# Patient Record
Sex: MALE | Race: WHITE | NOT HISPANIC OR LATINO | URBAN - METROPOLITAN AREA
[De-identification: names, ages, dates, MRNs, and addresses within clinical notes are randomized per-mention and may not be internally consistent; named-entity substitution may affect disease eponyms.]

---

## 2019-08-30 ENCOUNTER — EMERGENCY (EMERGENCY)
Facility: HOSPITAL | Age: 49
LOS: 1 days | Discharge: HOME | End: 2019-08-30
Attending: EMERGENCY MEDICINE | Admitting: EMERGENCY MEDICINE
Payer: MEDICARE

## 2019-08-30 VITALS
HEART RATE: 88 BPM | OXYGEN SATURATION: 99 % | TEMPERATURE: 98 F | DIASTOLIC BLOOD PRESSURE: 85 MMHG | SYSTOLIC BLOOD PRESSURE: 132 MMHG | RESPIRATION RATE: 18 BRPM

## 2019-08-30 VITALS
DIASTOLIC BLOOD PRESSURE: 90 MMHG | OXYGEN SATURATION: 96 % | TEMPERATURE: 98 F | RESPIRATION RATE: 19 BRPM | HEART RATE: 104 BPM | SYSTOLIC BLOOD PRESSURE: 131 MMHG

## 2019-08-30 LAB
ALBUMIN SERPL ELPH-MCNC: 3.7 G/DL — SIGNIFICANT CHANGE UP (ref 3.4–5)
ALP SERPL-CCNC: 106 U/L — SIGNIFICANT CHANGE UP (ref 40–120)
ALT FLD-CCNC: 40 U/L — SIGNIFICANT CHANGE UP (ref 12–42)
ANION GAP SERPL CALC-SCNC: 5 MMOL/L — LOW (ref 9–16)
APTT BLD: 28.4 SEC — SIGNIFICANT CHANGE UP (ref 27.5–36.3)
AST SERPL-CCNC: 23 U/L — SIGNIFICANT CHANGE UP (ref 15–37)
BILIRUB SERPL-MCNC: 0.4 MG/DL — SIGNIFICANT CHANGE UP (ref 0.2–1.2)
BUN SERPL-MCNC: 21 MG/DL — SIGNIFICANT CHANGE UP (ref 7–23)
CALCIUM SERPL-MCNC: 9.2 MG/DL — SIGNIFICANT CHANGE UP (ref 8.5–10.5)
CHLORIDE SERPL-SCNC: 107 MMOL/L — SIGNIFICANT CHANGE UP (ref 96–108)
CO2 SERPL-SCNC: 30 MMOL/L — SIGNIFICANT CHANGE UP (ref 22–31)
CREAT SERPL-MCNC: 0.92 MG/DL — SIGNIFICANT CHANGE UP (ref 0.5–1.3)
GLUCOSE SERPL-MCNC: 105 MG/DL — HIGH (ref 70–99)
HCT VFR BLD CALC: 41.9 % — SIGNIFICANT CHANGE UP (ref 39–50)
HGB BLD-MCNC: 14.3 G/DL — SIGNIFICANT CHANGE UP (ref 13–17)
INR BLD: 0.98 — SIGNIFICANT CHANGE UP (ref 0.88–1.16)
MCHC RBC-ENTMCNC: 32.4 PG — SIGNIFICANT CHANGE UP (ref 27–34)
MCHC RBC-ENTMCNC: 34.1 G/DL — SIGNIFICANT CHANGE UP (ref 32–36)
MCV RBC AUTO: 95 FL — SIGNIFICANT CHANGE UP (ref 80–100)
PLATELET # BLD AUTO: 225 K/UL — SIGNIFICANT CHANGE UP (ref 150–400)
POTASSIUM SERPL-MCNC: 3.6 MMOL/L — SIGNIFICANT CHANGE UP (ref 3.5–5.3)
POTASSIUM SERPL-SCNC: 3.6 MMOL/L — SIGNIFICANT CHANGE UP (ref 3.5–5.3)
PROT SERPL-MCNC: 6.9 G/DL — SIGNIFICANT CHANGE UP (ref 6.4–8.2)
PROTHROM AB SERPL-ACNC: 10.9 SEC — SIGNIFICANT CHANGE UP (ref 10–12.9)
RBC # BLD: 4.41 M/UL — SIGNIFICANT CHANGE UP (ref 4.2–5.8)
RBC # FLD: 13.2 % — SIGNIFICANT CHANGE UP (ref 10.3–14.5)
SODIUM SERPL-SCNC: 142 MMOL/L — SIGNIFICANT CHANGE UP (ref 132–145)
WBC # BLD: 5.6 K/UL — SIGNIFICANT CHANGE UP (ref 3.8–10.5)
WBC # FLD AUTO: 5.6 K/UL — SIGNIFICANT CHANGE UP (ref 3.8–10.5)

## 2019-08-30 PROCEDURE — 99284 EMERGENCY DEPT VISIT MOD MDM: CPT

## 2019-08-30 PROCEDURE — 71275 CT ANGIOGRAPHY CHEST: CPT | Mod: 26

## 2019-08-30 PROCEDURE — 93010 ELECTROCARDIOGRAM REPORT: CPT

## 2019-08-30 NOTE — ED ADULT NURSE REASSESSMENT NOTE - NS ED NURSE REASSESS COMMENT FT1
Pt returned from CTA. Pt placed back on continuos cardiac monitor and pulse oximetry. Pending results. Pt denies chest pain and sob at this time. Safety precautions maintained. Will continue to monitor. Pt received from david RN. Pt returned from CTA for rule out PE. Pt placed back on continuos cardiac monitor and pulse oximetry. Pending results of CTA. Pt denies chest pain and sob at this time. Pt speaking in full sentences unlabored. Pt vitals as per flow sheet. Safety precautions maintained. Will continue to monitor.

## 2019-08-30 NOTE — ED PROVIDER NOTE - PMH
HIV (human immunodeficiency virus infection) HIV (human immunodeficiency virus infection)    Pulmonary embolism

## 2019-08-30 NOTE — ED PROVIDER NOTE - PATIENT PORTAL LINK FT
You can access the FollowMyHealth Patient Portal offered by [facility] by registering at the following website: [email or web address]. By joining [employer]’s FollowMyHealth portal, you will also be able to view your health information using other applications (apps) compatible with our system.

## 2019-08-30 NOTE — ED ADULT NURSE NOTE - CHIEF COMPLAINT QUOTE
Pt walked into ED from Urgent care. Pt states 'I was treated for PNA 6 weeks ago and I finished my last antibiotics 2 days ago, but I continue to feel a tightness in my chest, SOB, lightheaded, dizzy, nauseous at this time and feel like I have heartburn. I went back to urgent care and they told me to come to ED to r/o PE". Pt states he has history of PE.

## 2019-08-30 NOTE — ED ADULT TRIAGE NOTE - CHIEF COMPLAINT QUOTE
Pt walked into ED from Urgent care. Pt states 'I was treated for PNA 6 weeks ago and I finished my last antibiotics 2 days ago, but I continue to feel a tightness in my chest, SOB, lightheaded, dizzy, nauseous at this time. I went back to urgent care and they told me to come to ED to r/o PE". Pt states he has history of PE. Pt walked into ED from Urgent care. Pt states 'I was treated for PNA 6 weeks ago and I finished my last antibiotics 2 days ago, but I continue to feel a tightness in my chest, SOB, lightheaded, dizzy, nauseous at this time and feel like I have heartburn. I went back to urgent care and they told me to come to ED to r/o PE". Pt states he has history of PE.

## 2019-08-30 NOTE — ED PROVIDER NOTE - CLINICAL SUMMARY MEDICAL DECISION MAKING FREE TEXT BOX
48 y/o male with multiple complaints including chest pain, SOB, and dizziness x 2 days. VSS. Exam unremarkable. However, given prior hx of PE, will do basic labs and CT angio chest to r/o PE. 48 y/o male with multiple complaints including chest pain, SOB, and dizziness x 2 days. VSS. Exam unremarkable. However, given prior hx of PE, will do basic labs and CT angio chest to r/o PE.  labs/ Ct neg for pe, suspect sx 2/2 anxiety. pt. otherwise stable for d/c,

## 2019-08-30 NOTE — ED PROVIDER NOTE - ATTENDING CONTRIBUTION TO CARE
pt w hx of HIV, pneumonia, PE, presents w SOB, will work up for PE. difficult IV access, US guided R antecub 20 gauge placed.

## 2019-08-30 NOTE — ED PROVIDER NOTE - NS_EDPROVIDERDISPOUSERTYPE_ED_A_ED
Scribe Attestation (For Scribes USE Only)... Scribe Attestation (For Scribes USE Only).../Attending Attestation (For Attendings USE Only)... I have personally evaluated and examined the patient. The Attending was available to me as a supervising provider if needed./Scribe Attestation (For Scribes USE Only).../Attending Attestation (For Attendings USE Only)...

## 2019-08-30 NOTE — ED PROVIDER NOTE - OBJECTIVE STATEMENT
50 y/o male with PMHx of HIV (undectable, unknown viral load, compliant with medication, Genvoya), and PE (in 2010 s/p immobilization in wheelchair after accident) presents to the ED with complaints of chest tightness, SOB, lightheadedness, dizziness, and nausea x 2 days. Pt states he was diagnoses with PNA 6 weeks ago and completed a course of abx (doxycycline and azithromycin) 3 days ago with improvement in symptoms. Pt was seen at [facility], had a chest x-ray which was normal, and referred to ED for further evaluation. Denies fever, chills, wheezing, palpitations. 50 y/o male with PMHx of HIV (undectable, unknown viral load, compliant with medication, Genvoya), and PE (in 2010 s/p immobilization in wheelchair after accident) presents to the ED with complaints of chest tightness, SOB, lightheadedness, dizziness, and nausea x 2 days. Pt states he was diagnosed with PNA 6 weeks ago and completed a course of abx (doxycycline and azithromycin) 3 days ago with improvement in symptoms. Pt was seen at [facility], had a chest x-ray which was normal, and referred to ED for further evaluation. Denies fever, chills, wheezing, palpitations.

## 2019-09-03 DIAGNOSIS — R11.0 NAUSEA: ICD-10-CM

## 2019-09-03 DIAGNOSIS — R42 DIZZINESS AND GIDDINESS: ICD-10-CM

## 2019-09-03 DIAGNOSIS — R07.89 OTHER CHEST PAIN: ICD-10-CM

## 2019-09-03 DIAGNOSIS — R06.02 SHORTNESS OF BREATH: ICD-10-CM

## 2020-02-26 ENCOUNTER — EMERGENCY (EMERGENCY)
Facility: HOSPITAL | Age: 50
LOS: 1 days | Discharge: LEFT BEFORE DISCHARGE | End: 2020-02-26
Admitting: EMERGENCY MEDICINE
Payer: MEDICARE

## 2020-02-26 VITALS
WEIGHT: 139.99 LBS | SYSTOLIC BLOOD PRESSURE: 110 MMHG | RESPIRATION RATE: 18 BRPM | TEMPERATURE: 98 F | HEIGHT: 70 IN | HEART RATE: 100 BPM | OXYGEN SATURATION: 98 % | DIASTOLIC BLOOD PRESSURE: 77 MMHG

## 2020-02-26 PROBLEM — I26.99 OTHER PULMONARY EMBOLISM WITHOUT ACUTE COR PULMONALE: Chronic | Status: ACTIVE | Noted: 2019-08-30

## 2020-02-26 PROBLEM — B20 HUMAN IMMUNODEFICIENCY VIRUS [HIV] DISEASE: Chronic | Status: ACTIVE | Noted: 2019-08-30

## 2020-02-26 PROCEDURE — L9991: CPT

## 2020-02-26 NOTE — ED ADULT TRIAGE NOTE - CHIEF COMPLAINT QUOTE
PT complaining of nausea, vomiting, diarrhea, and weakness x2 days. Pt states that he also had an episode of urinary incontinence 4 days ago.

## 2020-03-01 DIAGNOSIS — R53.1 WEAKNESS: ICD-10-CM

## 2020-03-01 DIAGNOSIS — R11.2 NAUSEA WITH VOMITING, UNSPECIFIED: ICD-10-CM

## 2020-03-01 DIAGNOSIS — R19.7 DIARRHEA, UNSPECIFIED: ICD-10-CM

## 2020-04-01 ENCOUNTER — OUTPATIENT (OUTPATIENT)
Dept: OUTPATIENT SERVICES | Facility: HOSPITAL | Age: 50
LOS: 1 days | End: 2020-04-01
Payer: MEDICARE

## 2020-04-01 PROCEDURE — G9001: CPT

## 2020-04-20 DIAGNOSIS — Z71.89 OTHER SPECIFIED COUNSELING: ICD-10-CM

## 2020-06-28 ENCOUNTER — EMERGENCY (EMERGENCY)
Facility: HOSPITAL | Age: 50
LOS: 1 days | Discharge: HOME | End: 2020-06-28
Attending: EMERGENCY MEDICINE | Admitting: EMERGENCY MEDICINE
Payer: MEDICARE

## 2020-06-28 VITALS
RESPIRATION RATE: 20 BRPM | HEIGHT: 67 IN | TEMPERATURE: 98 F | DIASTOLIC BLOOD PRESSURE: 93 MMHG | HEART RATE: 100 BPM | OXYGEN SATURATION: 98 % | WEIGHT: 154.98 LBS | SYSTOLIC BLOOD PRESSURE: 145 MMHG

## 2020-06-28 VITALS
RESPIRATION RATE: 18 BRPM | OXYGEN SATURATION: 97 % | DIASTOLIC BLOOD PRESSURE: 92 MMHG | TEMPERATURE: 98 F | SYSTOLIC BLOOD PRESSURE: 143 MMHG | HEART RATE: 84 BPM

## 2020-06-28 DIAGNOSIS — R30.0 DYSURIA: ICD-10-CM

## 2020-06-28 DIAGNOSIS — Z20.828 CONTACT WITH AND (SUSPECTED) EXPOSURE TO OTHER VIRAL COMMUNICABLE DISEASES: ICD-10-CM

## 2020-06-28 DIAGNOSIS — K52.9 NONINFECTIVE GASTROENTERITIS AND COLITIS, UNSPECIFIED: ICD-10-CM

## 2020-06-28 LAB
ALBUMIN SERPL ELPH-MCNC: 3.6 G/DL — SIGNIFICANT CHANGE UP (ref 3.4–5)
ALP SERPL-CCNC: 117 U/L — SIGNIFICANT CHANGE UP (ref 40–120)
ALT FLD-CCNC: 45 U/L — HIGH (ref 12–42)
ANION GAP SERPL CALC-SCNC: 9 MMOL/L — SIGNIFICANT CHANGE UP (ref 9–16)
APPEARANCE UR: CLEAR — SIGNIFICANT CHANGE UP
AST SERPL-CCNC: 24 U/L — SIGNIFICANT CHANGE UP (ref 15–37)
BACTERIA # UR AUTO: PRESENT /HPF
BASOPHILS # BLD AUTO: 0.04 K/UL — SIGNIFICANT CHANGE UP (ref 0–0.2)
BASOPHILS NFR BLD AUTO: 0.7 % — SIGNIFICANT CHANGE UP (ref 0–2)
BILIRUB SERPL-MCNC: 0.3 MG/DL — SIGNIFICANT CHANGE UP (ref 0.2–1.2)
BILIRUB UR-MCNC: NEGATIVE — SIGNIFICANT CHANGE UP
BUN SERPL-MCNC: 29 MG/DL — HIGH (ref 7–23)
CALCIUM SERPL-MCNC: 9.2 MG/DL — SIGNIFICANT CHANGE UP (ref 8.5–10.5)
CHLORIDE SERPL-SCNC: 107 MMOL/L — SIGNIFICANT CHANGE UP (ref 96–108)
CO2 SERPL-SCNC: 27 MMOL/L — SIGNIFICANT CHANGE UP (ref 22–31)
COLOR SPEC: YELLOW — SIGNIFICANT CHANGE UP
CREAT SERPL-MCNC: 0.94 MG/DL — SIGNIFICANT CHANGE UP (ref 0.5–1.3)
DIFF PNL FLD: ABNORMAL
EOSINOPHIL # BLD AUTO: 0.12 K/UL — SIGNIFICANT CHANGE UP (ref 0–0.5)
EOSINOPHIL NFR BLD AUTO: 2.2 % — SIGNIFICANT CHANGE UP (ref 0–6)
EPI CELLS # UR: SIGNIFICANT CHANGE UP /HPF (ref 0–5)
GLUCOSE SERPL-MCNC: 104 MG/DL — HIGH (ref 70–99)
GLUCOSE UR QL: NEGATIVE — SIGNIFICANT CHANGE UP
HCT VFR BLD CALC: 40.6 % — SIGNIFICANT CHANGE UP (ref 39–50)
HGB BLD-MCNC: 13.8 G/DL — SIGNIFICANT CHANGE UP (ref 13–17)
IMM GRANULOCYTES NFR BLD AUTO: 0.2 % — SIGNIFICANT CHANGE UP (ref 0–1.5)
KETONES UR-MCNC: ABNORMAL MG/DL
LEUKOCYTE ESTERASE UR-ACNC: NEGATIVE — SIGNIFICANT CHANGE UP
LIDOCAIN IGE QN: 123 U/L — SIGNIFICANT CHANGE UP (ref 73–393)
LYMPHOCYTES # BLD AUTO: 1.82 K/UL — SIGNIFICANT CHANGE UP (ref 1–3.3)
LYMPHOCYTES # BLD AUTO: 34.1 % — SIGNIFICANT CHANGE UP (ref 13–44)
MCHC RBC-ENTMCNC: 32.1 PG — SIGNIFICANT CHANGE UP (ref 27–34)
MCHC RBC-ENTMCNC: 34 GM/DL — SIGNIFICANT CHANGE UP (ref 32–36)
MCV RBC AUTO: 94.4 FL — SIGNIFICANT CHANGE UP (ref 80–100)
MONOCYTES # BLD AUTO: 0.68 K/UL — SIGNIFICANT CHANGE UP (ref 0–0.9)
MONOCYTES NFR BLD AUTO: 12.7 % — SIGNIFICANT CHANGE UP (ref 2–14)
NEUTROPHILS # BLD AUTO: 2.67 K/UL — SIGNIFICANT CHANGE UP (ref 1.8–7.4)
NEUTROPHILS NFR BLD AUTO: 50.1 % — SIGNIFICANT CHANGE UP (ref 43–77)
NITRITE UR-MCNC: NEGATIVE — SIGNIFICANT CHANGE UP
NRBC # BLD: 0 /100 WBCS — SIGNIFICANT CHANGE UP (ref 0–0)
PH UR: 6 — SIGNIFICANT CHANGE UP (ref 5–8)
PLATELET # BLD AUTO: 209 K/UL — SIGNIFICANT CHANGE UP (ref 150–400)
POTASSIUM SERPL-MCNC: 3.9 MMOL/L — SIGNIFICANT CHANGE UP (ref 3.5–5.3)
POTASSIUM SERPL-SCNC: 3.9 MMOL/L — SIGNIFICANT CHANGE UP (ref 3.5–5.3)
PROT SERPL-MCNC: 6.9 G/DL — SIGNIFICANT CHANGE UP (ref 6.4–8.2)
PROT UR-MCNC: NEGATIVE MG/DL — SIGNIFICANT CHANGE UP
RBC # BLD: 4.3 M/UL — SIGNIFICANT CHANGE UP (ref 4.2–5.8)
RBC # FLD: 13.2 % — SIGNIFICANT CHANGE UP (ref 10.3–14.5)
RBC CASTS # UR COMP ASSIST: < 5 /HPF — SIGNIFICANT CHANGE UP
SARS-COV-2 RNA SPEC QL NAA+PROBE: SIGNIFICANT CHANGE UP
SODIUM SERPL-SCNC: 143 MMOL/L — SIGNIFICANT CHANGE UP (ref 132–145)
SP GR SPEC: >=1.03 — SIGNIFICANT CHANGE UP (ref 1–1.03)
UROBILINOGEN FLD QL: 1 E.U./DL — SIGNIFICANT CHANGE UP
WBC # BLD: 5.34 K/UL — SIGNIFICANT CHANGE UP (ref 3.8–10.5)
WBC # FLD AUTO: 5.34 K/UL — SIGNIFICANT CHANGE UP (ref 3.8–10.5)
WBC UR QL: < 5 /HPF — SIGNIFICANT CHANGE UP

## 2020-06-28 PROCEDURE — 74177 CT ABD & PELVIS W/CONTRAST: CPT | Mod: 26

## 2020-06-28 PROCEDURE — 99284 EMERGENCY DEPT VISIT MOD MDM: CPT | Mod: CS

## 2020-06-28 PROCEDURE — 76870 US EXAM SCROTUM: CPT | Mod: 26

## 2020-06-28 RX ORDER — CEFTRIAXONE 500 MG/1
250 INJECTION, POWDER, FOR SOLUTION INTRAMUSCULAR; INTRAVENOUS ONCE
Refills: 0 | Status: COMPLETED | OUTPATIENT
Start: 2020-06-28 | End: 2020-06-28

## 2020-06-28 RX ORDER — PENICILLIN G BENZATHINE 1200000 [IU]/2ML
2.4 INJECTION, SUSPENSION INTRAMUSCULAR ONCE
Refills: 0 | Status: COMPLETED | OUTPATIENT
Start: 2020-06-28 | End: 2020-06-28

## 2020-06-28 RX ORDER — KETOROLAC TROMETHAMINE 30 MG/ML
30 SYRINGE (ML) INJECTION ONCE
Refills: 0 | Status: DISCONTINUED | OUTPATIENT
Start: 2020-06-28 | End: 2020-06-28

## 2020-06-28 RX ORDER — AZITHROMYCIN 500 MG/1
1000 TABLET, FILM COATED ORAL ONCE
Refills: 0 | Status: COMPLETED | OUTPATIENT
Start: 2020-06-28 | End: 2020-06-28

## 2020-06-28 RX ORDER — SODIUM CHLORIDE 9 MG/ML
1000 INJECTION INTRAMUSCULAR; INTRAVENOUS; SUBCUTANEOUS ONCE
Refills: 0 | Status: COMPLETED | OUTPATIENT
Start: 2020-06-28 | End: 2020-06-28

## 2020-06-28 RX ORDER — IOHEXOL 300 MG/ML
30 INJECTION, SOLUTION INTRAVENOUS ONCE
Refills: 0 | Status: COMPLETED | OUTPATIENT
Start: 2020-06-28 | End: 2020-06-28

## 2020-06-28 RX ADMIN — IOHEXOL 30 MILLILITER(S): 300 INJECTION, SOLUTION INTRAVENOUS at 05:48

## 2020-06-28 RX ADMIN — PENICILLIN G BENZATHINE 2.4 MILLION UNIT(S): 1200000 INJECTION, SUSPENSION INTRAMUSCULAR at 08:07

## 2020-06-28 RX ADMIN — AZITHROMYCIN 1000 MILLIGRAM(S): 500 TABLET, FILM COATED ORAL at 08:04

## 2020-06-28 RX ADMIN — CEFTRIAXONE 250 MILLIGRAM(S): 500 INJECTION, POWDER, FOR SOLUTION INTRAMUSCULAR; INTRAVENOUS at 08:05

## 2020-06-28 RX ADMIN — SODIUM CHLORIDE 1000 MILLILITER(S): 9 INJECTION INTRAMUSCULAR; INTRAVENOUS; SUBCUTANEOUS at 05:48

## 2020-06-28 RX ADMIN — Medication 30 MILLIGRAM(S): at 05:48

## 2020-06-28 NOTE — ED ADULT NURSE NOTE - OBJECTIVE STATEMENT
50y male presents to ED c/o right sided testicular pain for four days. Pt states that pain radiates to abdominal area. Denies n/v/d, chest pain, SOB, fever, chills. Pt has hx of HIV, compliant with meds, undetectable. Pt tested positive for Covid in March, tested one week ago and virus swab was negative, antibodies were positive. A&Ox4.

## 2020-06-28 NOTE — ED PROVIDER NOTE - GENITOURINARY, MLM
No discharge, lesions. testicles no significant swelling. R testicle mild swelling, normal lie, normal cremasteric reflex.

## 2020-06-28 NOTE — ED PROVIDER NOTE - CARE PROVIDER_API CALL
Eber Long  UROLOGY  [address] [place] [address]  Phone: [phone]  Fax: [phone]  Follow Up Time: 1-3 Days

## 2020-06-28 NOTE — ED PROVIDER NOTE - CLINICAL SUMMARY MEDICAL DECISION MAKING FREE TEXT BOX
Pt w dysuria and hesitancy w some abd discomfort, testicular discomfort. low suspicion for torsion so held pt til AM for US as well as did rest of work up including labs, UA, CT abd. Well appearing, in no distress. Pt w dysuria and hesitancy w some abd discomfort, testicular discomfort. low suspicion for torsion so held pt til AM for US as well as did rest of work up including labs, UA, CT abd. Well appearing, in no distress.    The patient's symptoms progressively improved throughout the ED stay.  ED evaluation and management discussed with the patient and family (if available) in detail.  Close PMD and/or specialist follow up encouraged.  Strict ED return instructions discussed in detail for any worsening or new symptoms. The patient was given the opportunity to ask any questions about their discharge diagnosis and discharge instructions.  The patient verbalized understanding of these instructions and need to return to the ED for any worsening of illness or for any concern. The patient understands the Emergency Department diagnosis is a preliminary diagnosis often based on limited information and that the patient must adhere to the follow-up plan as discussed. The patient tolerated PO fluids.  At the time of discharge from the Emergency Department, the patient is alert with fluent appropriate speech and ambulatory without difficulty.  A medical screening examination was performed and no emergency medical condition was identified.    asymptomatic at time of dc  repeat abd exam soft and non tender

## 2020-06-28 NOTE — ED PROVIDER NOTE - NSFOLLOWUPINSTRUCTIONS_ED_ALL_ED_FT
follow up with urology    take all prior medications as directed    return to ER if symptoms worsen or for any other concern

## 2020-06-28 NOTE — ED ADULT NURSE NOTE - NSIMPLEMENTINTERV_GEN_ALL_ED
Implemented All Universal Safety Interventions:  [place] to call system. Call bell, personal items and telephone within reach. Instruct patient to call for assistance. Room bathroom lighting operational. Non-slip footwear when patient is off stretcher. Physically safe environment: no spills, clutter or unnecessary equipment. Stretcher in lowest position, wheels locked, appropriate side rails in place.

## 2020-06-28 NOTE — ED ADULT TRIAGE NOTE - CHIEF COMPLAINT QUOTE
walk in pt with complaints of right sided testicular pain, redness and swelling since yesterday radiating to groin and right abdominal pain.

## 2020-06-28 NOTE — ED PROVIDER NOTE - PROGRESS NOTE DETAILS
UA w signs of infection, cultures sent. Covered w abxs for gc/chlam/syphilis. Pending CT abd and US. WIll sign out to day team

## 2020-06-28 NOTE — ED PROVIDER NOTE - OBJECTIVE STATEMENT
51 yo male pt, hx of HIV, on HAART, undetectable VL, presents w dysuria, urinary hesitancy that has been intermitent for several months and worsened in the last 5 days, accompanied with some intermitent abdominal pain and some testicular discomfort, R more than L. Pt has tried to f/u with his PMD for follow up with trouble w urination but has been unable to. Has never seen a Urologist. Evaluated at  yesterday and told to come for evaluation to ED but pt had some things to attend to. pt denies hematuria, penile dc, vomiting, fever, chills. Pt is sexually active w male and female partners. Had labs in May that now normal results for chlamydia/gc.

## 2020-06-29 LAB
C TRACH RRNA SPEC QL NAA+PROBE: SIGNIFICANT CHANGE UP
N GONORRHOEA RRNA SPEC QL NAA+PROBE: SIGNIFICANT CHANGE UP
SPECIMEN SOURCE: SIGNIFICANT CHANGE UP
T PALLIDUM AB TITR SER: NEGATIVE — SIGNIFICANT CHANGE UP

## 2020-07-26 ENCOUNTER — EMERGENCY (EMERGENCY)
Facility: HOSPITAL | Age: 50
LOS: 1 days | Discharge: HOME | End: 2020-07-26
Attending: EMERGENCY MEDICINE | Admitting: PSYCHIATRY & NEUROLOGY
Payer: MEDICARE

## 2020-07-26 VITALS
RESPIRATION RATE: 16 BRPM | HEIGHT: 70 IN | WEIGHT: 139.99 LBS | SYSTOLIC BLOOD PRESSURE: 138 MMHG | DIASTOLIC BLOOD PRESSURE: 76 MMHG | TEMPERATURE: 98 F | OXYGEN SATURATION: 96 % | HEART RATE: 92 BPM

## 2020-07-26 DIAGNOSIS — G62.9 POLYNEUROPATHY, UNSPECIFIED: ICD-10-CM

## 2020-07-26 DIAGNOSIS — M79.671 PAIN IN RIGHT FOOT: ICD-10-CM

## 2020-07-26 PROCEDURE — 99283 EMERGENCY DEPT VISIT LOW MDM: CPT

## 2020-07-26 RX ORDER — GABAPENTIN 400 MG/1
1 CAPSULE ORAL
Qty: 90 | Refills: 0
Start: 2020-07-26 | End: 2020-08-24

## 2020-07-26 RX ORDER — HYDROXYZINE HCL 10 MG
25 TABLET ORAL ONCE
Refills: 0 | Status: COMPLETED | OUTPATIENT
Start: 2020-07-26 | End: 2020-07-26

## 2020-07-26 RX ORDER — HYDROXYZINE HCL 10 MG
1 TABLET ORAL
Qty: 12 | Refills: 0
Start: 2020-07-26 | End: 2020-07-28

## 2020-07-26 RX ADMIN — Medication 25 MILLIGRAM(S): at 17:47

## 2020-07-26 NOTE — ED ADULT NURSE NOTE - OBJECTIVE STATEMENT
Pt complaining of bilateral leg swelling and pain since having covid in February. Pt complaining of bilateral leg swelling and pain since having covid in February. PT also complaining of bilateral arm itching. Pt denies chest pain, sob, fever, N/V/D.

## 2020-07-26 NOTE — ED PROVIDER NOTE - OBJECTIVE STATEMENT
49 yo M, pmhx of HIV on HAART therapy, reports h/o neuropathy on neurontin years ago, but stopped for unknown reason, h/o COVID in feb, c/o bilateral feet burning and pain with some dependent swelling since Feb. Patient says he has been "googling" and found that he thinks this is a symptom. He also reports "itching to his arms bilaterally" with a rash since wednesday on his arms. Patient denies sick contacts, recent travel.

## 2020-07-26 NOTE — ED ADULT NURSE NOTE - CHPI ED NUR SYMPTOMS NEG
no nausea/no decreased eating/drinking/no tingling/no weakness/no vomiting/no fever/no dizziness/no chills

## 2020-07-26 NOTE — ED PROVIDER NOTE - PATIENT PORTAL LINK FT
You can access the FollowMyHealth Patient Portal offered by [facility] by registering at the following website: [email or web address]. By joining [employer]’s FollowMyHealth portal, you will also be able to view your health information using other applications (apps) compatible with our system.

## 2020-07-26 NOTE — ED PROVIDER NOTE - CLINICAL SUMMARY MEDICAL DECISION MAKING FREE TEXT BOX
Patient with h/o HIV, neuropathy, c/o pain to feet and subjective swelling to feet, not present on exam. 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 Will restart Neurontin 100mg po tid and prescribe. Patient appears to have insect bites to arms, causing pruritis. Patient says he took benadryl last night. Will prescribe Atarax. D/C to home.

## 2020-07-26 NOTE — ED PROVIDER NOTE - NSFOLLOWUPINSTRUCTIONS_ED_ALL_ED_FT
F/u with your PCP      Peripheral Neuropathy    WHAT YOU NEED TO KNOW:    Peripheral neuropathy is a condition that affects how your nerves work. Nerves carry information from your brain to your body. The information does not transfer along your nerves correctly when you have neuropathy. When you have peripheral neuropathy, the nerves in your legs, arms, feet, or hands are affected. It also may affect your organs, such as your lungs, stomach, bladder, or genitals. This condition may go away on its own or you may always have it.    DISCHARGE INSTRUCTIONS:    Medicines:     Pain medicine: You may be given medicine to take away or decrease pain. Do not wait until the pain is severe before you take your medicine.      Antidepressants: This medicine helps to decrease or stop the symptoms of depression. It also used to help decrease pain. Take this medicine as directed.      Antiseizure medicine: This medicine is usually given to control seizures, but it also helps with nerve pain.       Take your medicine as directed. Contact your healthcare provider if you think your medicine is not helping or if you have side effects. Tell him of her if you are allergic to any medicine. Keep a list of the medicines, vitamins, and herbs you take. Include the amounts, and when and why you take them. Bring the list or the pill bottles to follow-up visits. Carry your medicine list with you in case of an emergency.    Follow up with your healthcare provider or neurologist as directed: Write down your questions so you remember to ask them during your visits.    Physical therapy: Physical and occupational therapists may help you exercise your arms, legs, and hands. They may teach you new ways to do things at home.    Brace or splint: You may need a device that supports or holds a body part still. For example, if you have carpal tunnel syndrome, you may need to wear a wrist brace.    Manage your peripheral neuropathy:     Avoid falls: Move with care and stand up slowly. Wear shoes that support your feet, and do not go barefoot. Ask about walking aids, such as a cane or walker. You may want to install railings or nonslip pads in your home, especially in the bathroom. Ask for more information on how to avoid falls.      Check your skin daily: Sores can form where your skin makes contact with chairs, beds, or other body parts. They also can form under splints. Keep your skin clean, and check your skin daily for sores.      Exercise: Ask about the best exercise plan for you. Physical activity may increase your balance and strength and may decrease your pain. It is best to start exercising slowly and do more as you get stronger.     Contact your healthcare provider or neurologist if:     Your pain is severe.       You cannot control your bladder.       You have trouble having sex.      You have questions or concerns about your condition or care.    Return to the emergency department if:     You fall.       You cannot walk at all.          © Copyright [employer] 2020       back to top                      © Copyright [employer] 2020                 Insect Bite, Adult  An insect bite can make your skin red, itchy, and swollen. Some insects can spread disease to people with a bite. However, most insect bites do not lead to disease, and most are not serious.  What are the causes?  Insects may bite for many reasons, including:  Hunger. To defend themselves.Insects that bite include:  Spiders. Mosquitoes. Ticks. Fleas. Ants. Flies. Kissing bugs. Chiggers.What are the signs or symptoms?  Symptoms of this condition include:  Itching or pain in the bite area. Redness and swelling in the bite area. An open wound (skin ulcer).Symptoms often last for 2–4 days.  In rare cases, a person may have a very bad allergic reaction (anaphylactic reaction) to a bite. Symptoms of an anaphylactic reaction may include:  Feeling warm in the face (flushed). Your face may turn red.Itchy, red, swollen areas of skin (hives).Swelling of the:  Eyes.Lips.Face.Mouth.Tongue.Throat.Trouble with any of these:  Breathing.Talking.Swallowing.Loud breathing (wheezing).Feeling dizzy or light-headed.Passing out (fainting).Pain or cramps in your belly.Throwing up (vomiting).Watery poop (diarrhea).How is this treated?  Treatment is usually not needed. Symptoms often go away on their own. When treatment is needed, it may involve:  Putting a cream or lotion on the bite area. This helps with itching.Taking an antibiotic medicine. This treatment is needed if the bite area gets infected. Getting a tetanus shot, if you are not up to date on this vaccine. Putting ice on the affected area.Using medicines called antihistamines. This treatment may be needed if you have itching or an allergic reaction to the insect bite.Giving yourself a shot of medicine (epinephrine) using an auto-injector "pen" if you have an anaphylactic reaction to a bite. Your doctor will teach you how to use this pen.Follow these instructions at home:  Bite area care        Do not scratch the bite area.Keep the bite area clean and dry.Wash the bite area every day with soap and water as told by your doctor.Check the bite area every day for signs of infection. Check for:  Redness, swelling, or pain.Fluid or blood.Warmth.Pus or a bad smell.Managing pain, itching, and swelling        You may put any of these on the bite area as told by your doctor:  A paste made of baking soda and water.Cortisone cream.Calamine lotion.If told, put ice on the bite area.  Put ice in a plastic bag.Place a towel between your skin and the bag.Leave the ice on for 20 minutes, 2–3 times a day.General instructions     Apply or take over-the-counter and prescription medicines only as told by your doctor.If you were prescribed an antibiotic medicine, take or apply it as told by your doctor. Do not stop using the antibiotic even if your condition improves.Keep all follow-up visits as told by your doctor. This is important.How is this prevented?  To help you have a lower risk of insect bites:  When you are outside, wear clothing that covers your arms and legs.Use insect repellent. The best insect repellents contain one of these:  DEET.Picaridin.Oil of lemon eucalyptus (OLE).WD2422.Consider spraying your clothing with a pesticide called permethrin. Permethrin helps prevent insect bites. It works for several weeks and for up to 5–6 clothing washes. Do not apply permethrin directly to the skin.If your home windows do not have screens, think about putting some in.If you will be sleeping in an area where there are mosquitoes, consider covering your sleeping area with a mosquito net.Contact a doctor if:  You have redness, swelling, or pain in the bite area.You have fluid or blood coming from the bite area.The bite area feels warm to the touch.You have pus or a bad smell coming from the bite area.You have a fever.Get help right away if:  You have joint pain.You have a rash.You feel more tired or sleepy than you normally do.You have neck pain.You have a headache.You feel weaker than you normally do.You have signs of an anaphylactic reaction. Signs may include:  Feeling warm in the face.Itchy, red, swollen areas of skin.Swelling of your:  Eyes.Lips.Face.Mouth.Tongue.Throat.Trouble with any of these:  Breathing.Talking.Swallowing.Loud breathing.Feeling dizzy or light-headed.Passing out.Pain or cramps in your belly.Throwing up.Watery poop.These symptoms may be an emergency. Do not wait to see if the symptoms will go away. Do this right away:   Use your auto-injector pen as you have been told. Get medical help. Call your local emergency services (911 in the [place] Do not drive yourself to the hospital. Summary  An insect bite can make your skin red, itchy, and swollen.Treatment is usually not needed. Symptoms often go away on their own.Do not scratch the bite area. Keep it clean and dry.Ice can help with pain and itching from the bite.This information is not intended to replace advice given to you by your health care provider. Make sure you discuss any questions you have with your health care provider.    Document Released: 12/15/2001 Document Revised: 06/28/2019 Document Reviewed: 06/28/2019  Elsevier Patient Education © 2020 [employer]

## 2020-11-16 ENCOUNTER — EMERGENCY (EMERGENCY)
Facility: HOSPITAL | Age: 50
LOS: 1 days | Discharge: HOME | End: 2020-11-16
Admitting: EMERGENCY MEDICINE
Payer: MEDICARE

## 2020-11-16 VITALS
TEMPERATURE: 98 F | OXYGEN SATURATION: 98 % | HEART RATE: 111 BPM | DIASTOLIC BLOOD PRESSURE: 98 MMHG | SYSTOLIC BLOOD PRESSURE: 137 MMHG | HEIGHT: 70 IN | RESPIRATION RATE: 20 BRPM

## 2020-11-16 VITALS — HEART RATE: 97 BPM

## 2020-11-16 DIAGNOSIS — R07.9 CHEST PAIN, UNSPECIFIED: ICD-10-CM

## 2020-11-16 DIAGNOSIS — R05 COUGH: ICD-10-CM

## 2020-11-16 DIAGNOSIS — R53.83 OTHER FATIGUE: ICD-10-CM

## 2020-11-16 DIAGNOSIS — U07.1 COVID-19: ICD-10-CM

## 2020-11-16 LAB
ALBUMIN SERPL ELPH-MCNC: 3.7 G/DL — SIGNIFICANT CHANGE UP (ref 3.4–5)
ALP SERPL-CCNC: 147 U/L — HIGH (ref 40–120)
ALT FLD-CCNC: 48 U/L — HIGH (ref 12–42)
ANION GAP SERPL CALC-SCNC: 7 MMOL/L — LOW (ref 9–16)
APPEARANCE UR: CLEAR — SIGNIFICANT CHANGE UP
AST SERPL-CCNC: 27 U/L — SIGNIFICANT CHANGE UP (ref 15–37)
BASOPHILS # BLD AUTO: 0.04 K/UL — SIGNIFICANT CHANGE UP (ref 0–0.2)
BASOPHILS NFR BLD AUTO: 0.8 % — SIGNIFICANT CHANGE UP (ref 0–2)
BILIRUB SERPL-MCNC: 0.5 MG/DL — SIGNIFICANT CHANGE UP (ref 0.2–1.2)
BILIRUB UR-MCNC: ABNORMAL
BUN SERPL-MCNC: 16 MG/DL — SIGNIFICANT CHANGE UP (ref 7–23)
CALCIUM SERPL-MCNC: 9.2 MG/DL — SIGNIFICANT CHANGE UP (ref 8.5–10.5)
CHLORIDE SERPL-SCNC: 104 MMOL/L — SIGNIFICANT CHANGE UP (ref 96–108)
CO2 SERPL-SCNC: 29 MMOL/L — SIGNIFICANT CHANGE UP (ref 22–31)
COLOR SPEC: YELLOW — SIGNIFICANT CHANGE UP
CREAT SERPL-MCNC: 0.96 MG/DL — SIGNIFICANT CHANGE UP (ref 0.5–1.3)
DIFF PNL FLD: NEGATIVE — SIGNIFICANT CHANGE UP
EOSINOPHIL # BLD AUTO: 0.1 K/UL — SIGNIFICANT CHANGE UP (ref 0–0.5)
EOSINOPHIL NFR BLD AUTO: 1.9 % — SIGNIFICANT CHANGE UP (ref 0–6)
GLUCOSE SERPL-MCNC: 100 MG/DL — HIGH (ref 70–99)
GLUCOSE UR QL: NEGATIVE — SIGNIFICANT CHANGE UP
HCT VFR BLD CALC: 43.3 % — SIGNIFICANT CHANGE UP (ref 39–50)
HGB BLD-MCNC: 14.2 G/DL — SIGNIFICANT CHANGE UP (ref 13–17)
IMM GRANULOCYTES NFR BLD AUTO: 0.2 % — SIGNIFICANT CHANGE UP (ref 0–1.5)
KETONES UR-MCNC: ABNORMAL MG/DL
LEUKOCYTE ESTERASE UR-ACNC: NEGATIVE — SIGNIFICANT CHANGE UP
LYMPHOCYTES # BLD AUTO: 1.67 K/UL — SIGNIFICANT CHANGE UP (ref 1–3.3)
LYMPHOCYTES # BLD AUTO: 31.6 % — SIGNIFICANT CHANGE UP (ref 13–44)
MCHC RBC-ENTMCNC: 31.1 PG — SIGNIFICANT CHANGE UP (ref 27–34)
MCHC RBC-ENTMCNC: 32.8 GM/DL — SIGNIFICANT CHANGE UP (ref 32–36)
MCV RBC AUTO: 95 FL — SIGNIFICANT CHANGE UP (ref 80–100)
MONOCYTES # BLD AUTO: 0.83 K/UL — SIGNIFICANT CHANGE UP (ref 0–0.9)
MONOCYTES NFR BLD AUTO: 15.7 % — HIGH (ref 2–14)
NEUTROPHILS # BLD AUTO: 2.63 K/UL — SIGNIFICANT CHANGE UP (ref 1.8–7.4)
NEUTROPHILS NFR BLD AUTO: 49.8 % — SIGNIFICANT CHANGE UP (ref 43–77)
NITRITE UR-MCNC: NEGATIVE — SIGNIFICANT CHANGE UP
NRBC # BLD: 0 /100 WBCS — SIGNIFICANT CHANGE UP (ref 0–0)
PH UR: 6 — SIGNIFICANT CHANGE UP (ref 5–8)
PLATELET # BLD AUTO: 242 K/UL — SIGNIFICANT CHANGE UP (ref 150–400)
POTASSIUM SERPL-MCNC: 4.4 MMOL/L — SIGNIFICANT CHANGE UP (ref 3.5–5.3)
POTASSIUM SERPL-SCNC: 4.4 MMOL/L — SIGNIFICANT CHANGE UP (ref 3.5–5.3)
PROT SERPL-MCNC: 7.9 G/DL — SIGNIFICANT CHANGE UP (ref 6.4–8.2)
PROT UR-MCNC: NEGATIVE MG/DL — SIGNIFICANT CHANGE UP
RBC # BLD: 4.56 M/UL — SIGNIFICANT CHANGE UP (ref 4.2–5.8)
RBC # FLD: 12.7 % — SIGNIFICANT CHANGE UP (ref 10.3–14.5)
SODIUM SERPL-SCNC: 140 MMOL/L — SIGNIFICANT CHANGE UP (ref 132–145)
SP GR SPEC: 1.02 — SIGNIFICANT CHANGE UP (ref 1–1.03)
UROBILINOGEN FLD QL: 2 E.U./DL
WBC # BLD: 5.28 K/UL — SIGNIFICANT CHANGE UP (ref 3.8–10.5)
WBC # FLD AUTO: 5.28 K/UL — SIGNIFICANT CHANGE UP (ref 3.8–10.5)

## 2020-11-16 PROCEDURE — 99285 EMERGENCY DEPT VISIT HI MDM: CPT

## 2020-11-16 PROCEDURE — 71045 X-RAY EXAM CHEST 1 VIEW: CPT | Mod: 26

## 2020-11-16 PROCEDURE — 71275 CT ANGIOGRAPHY CHEST: CPT | Mod: 26

## 2020-11-16 PROCEDURE — 93010 ELECTROCARDIOGRAM REPORT: CPT

## 2020-11-16 RX ORDER — SODIUM CHLORIDE 9 MG/ML
1000 INJECTION INTRAMUSCULAR; INTRAVENOUS; SUBCUTANEOUS ONCE
Refills: 0 | Status: COMPLETED | OUTPATIENT
Start: 2020-11-16 | End: 2020-11-16

## 2020-11-16 RX ORDER — AZITHROMYCIN 500 MG/1
1000 TABLET, FILM COATED ORAL ONCE
Refills: 0 | Status: COMPLETED | OUTPATIENT
Start: 2020-11-16 | End: 2020-11-16

## 2020-11-16 RX ORDER — CEFTRIAXONE 500 MG/1
250 INJECTION, POWDER, FOR SOLUTION INTRAMUSCULAR; INTRAVENOUS ONCE
Refills: 0 | Status: COMPLETED | OUTPATIENT
Start: 2020-11-16 | End: 2020-11-16

## 2020-11-16 RX ADMIN — CEFTRIAXONE 250 MILLIGRAM(S): 500 INJECTION, POWDER, FOR SOLUTION INTRAMUSCULAR; INTRAVENOUS at 12:55

## 2020-11-16 RX ADMIN — AZITHROMYCIN 1000 MILLIGRAM(S): 500 TABLET, FILM COATED ORAL at 12:55

## 2020-11-16 RX ADMIN — SODIUM CHLORIDE 1000 MILLILITER(S): 9 INJECTION INTRAMUSCULAR; INTRAVENOUS; SUBCUTANEOUS at 12:56

## 2020-11-16 NOTE — ED PROVIDER NOTE - NSFOLLOWUPINSTRUCTIONS_ED_ALL_ED_FT
Please follow up with your doctor within one week    Share all results with your doctor they are all attached for you      Return to the ed for any worsening or other alarming symptoms

## 2020-11-16 NOTE — ED ADULT TRIAGE NOTE - CHIEF COMPLAINT QUOTE
Pt. presents to the ED c/o cough, shortness of breath and diarrhea. Pt. also c/o penile discharge and difficulty utinating reports he was treated for syphillis about 3 weeks ago but his symptoms have not improved. Pt. endorses being Covid+ in March

## 2020-11-16 NOTE — ED PROVIDER NOTE - CLINICAL SUMMARY MEDICAL DECISION MAKING FREE TEXT BOX
49 y/o M presenting with coughs and burning with urination. On exam, Pt appears well and in no apparent distress. Will r/o PNA and UTI. Will cover for GC. Will reassess afterwards. 49 y/o M presenting with coughs and burning with urination. On exam, Pt appears well and in no apparent distress. Will r/o PNA and UTI. Will cover for GC. Will reassess afterwards.  pt treated for gc per request although states no new partners  pt had pain with cough cxr neg. given histyory of pe chest ct also done and was wnl, ekg showed no st elevations or changes   pt will follow up with his doctor on friday, discharged home in stable condition

## 2020-11-16 NOTE — ED PROVIDER NOTE - OBJECTIVE STATEMENT
51 y/o M with PMHx of HIV (undetectable viral load; On HAART), COVID-19 (February 2020) and PE in 2010 s/p period of immobilization in wheelchair after an accident (not currently on blood thinners) presents to the ED for 3 days of persistent coughs with some productive yellow phlegm. Pt also reports having generalized fatigue, burning with urination, and CP that only occurs with coughs. He denies new sexual partners, leg swelling, recent surgeries and other associated symptoms. Of note, Pt had a recent syphilis infection for which he was treated with Abx. However, Pt is asking to be re-tested.

## 2020-11-17 LAB
CULTURE RESULTS: SIGNIFICANT CHANGE UP
SPECIMEN SOURCE: SIGNIFICANT CHANGE UP

## 2020-11-18 LAB
N GONORRHOEA RRNA SPEC QL NAA+PROBE: SIGNIFICANT CHANGE UP
SPECIMEN SOURCE: SIGNIFICANT CHANGE UP
T PALLIDUM AB TITR SER: POSITIVE

## 2020-11-19 NOTE — ED POST DISCHARGE NOTE - RESULT SUMMARY
+syphilis titers, appears to be acute infection, not treated in ED for this, discussed results with patient, will rx doxycycline x 2 weeks. advised f/u pmd, safe sex practice discussed, patient verbalized understanding

## 2021-09-27 ENCOUNTER — EMERGENCY (EMERGENCY)
Facility: HOSPITAL | Age: 51
LOS: 1 days | Discharge: HOME | End: 2021-09-27
Admitting: EMERGENCY MEDICINE
Payer: MEDICARE

## 2021-09-27 VITALS
OXYGEN SATURATION: 96 % | SYSTOLIC BLOOD PRESSURE: 133 MMHG | WEIGHT: 160.06 LBS | TEMPERATURE: 98 F | DIASTOLIC BLOOD PRESSURE: 84 MMHG | RESPIRATION RATE: 16 BRPM | HEART RATE: 113 BPM | HEIGHT: 70 IN

## 2021-09-27 VITALS — HEART RATE: 105 BPM

## 2021-09-27 DIAGNOSIS — Z86.711 PERSONAL HISTORY OF PULMONARY EMBOLISM: ICD-10-CM

## 2021-09-27 DIAGNOSIS — J02.9 ACUTE PHARYNGITIS, UNSPECIFIED: ICD-10-CM

## 2021-09-27 DIAGNOSIS — Z21 ASYMPTOMATIC HUMAN IMMUNODEFICIENCY VIRUS [HIV] INFECTION STATUS: ICD-10-CM

## 2021-09-27 DIAGNOSIS — Z20.822 CONTACT WITH AND (SUSPECTED) EXPOSURE TO COVID-19: ICD-10-CM

## 2021-09-27 DIAGNOSIS — R06.02 SHORTNESS OF BREATH: ICD-10-CM

## 2021-09-27 DIAGNOSIS — R07.89 OTHER CHEST PAIN: ICD-10-CM

## 2021-09-27 DIAGNOSIS — R09.81 NASAL CONGESTION: ICD-10-CM

## 2021-09-27 DIAGNOSIS — R07.0 PAIN IN THROAT: ICD-10-CM

## 2021-09-27 LAB
ALBUMIN SERPL ELPH-MCNC: 3.9 G/DL — SIGNIFICANT CHANGE UP (ref 3.4–5)
ALP SERPL-CCNC: 110 U/L — SIGNIFICANT CHANGE UP (ref 40–120)
ALT FLD-CCNC: 36 U/L — SIGNIFICANT CHANGE UP (ref 12–42)
ANION GAP SERPL CALC-SCNC: 10 MMOL/L — SIGNIFICANT CHANGE UP (ref 9–16)
AST SERPL-CCNC: 13 U/L — LOW (ref 15–37)
BASOPHILS # BLD AUTO: 0.04 K/UL — SIGNIFICANT CHANGE UP (ref 0–0.2)
BASOPHILS NFR BLD AUTO: 0.7 % — SIGNIFICANT CHANGE UP (ref 0–2)
BILIRUB SERPL-MCNC: 0.3 MG/DL — SIGNIFICANT CHANGE UP (ref 0.2–1.2)
BUN SERPL-MCNC: 22 MG/DL — SIGNIFICANT CHANGE UP (ref 7–23)
CALCIUM SERPL-MCNC: 9.2 MG/DL — SIGNIFICANT CHANGE UP (ref 8.5–10.5)
CHLORIDE SERPL-SCNC: 107 MMOL/L — SIGNIFICANT CHANGE UP (ref 96–108)
CO2 SERPL-SCNC: 26 MMOL/L — SIGNIFICANT CHANGE UP (ref 22–31)
CREAT SERPL-MCNC: 0.86 MG/DL — SIGNIFICANT CHANGE UP (ref 0.5–1.3)
D DIMER BLD IA.RAPID-MCNC: <187 NG/ML DDU — SIGNIFICANT CHANGE UP
EOSINOPHIL # BLD AUTO: 0.13 K/UL — SIGNIFICANT CHANGE UP (ref 0–0.5)
EOSINOPHIL NFR BLD AUTO: 2.2 % — SIGNIFICANT CHANGE UP (ref 0–6)
FLUAV H1 2009 PAND RNA SPEC QL NAA+PROBE: SIGNIFICANT CHANGE UP
FLUAV H1 RNA SPEC QL NAA+PROBE: SIGNIFICANT CHANGE UP
FLUAV H3 RNA SPEC QL NAA+PROBE: SIGNIFICANT CHANGE UP
FLUAV SUBTYP SPEC NAA+PROBE: SIGNIFICANT CHANGE UP
FLUBV RNA SPEC QL NAA+PROBE: SIGNIFICANT CHANGE UP
GLUCOSE SERPL-MCNC: 106 MG/DL — HIGH (ref 70–99)
HCT VFR BLD CALC: 43 % — SIGNIFICANT CHANGE UP (ref 39–50)
HGB BLD-MCNC: 14.4 G/DL — SIGNIFICANT CHANGE UP (ref 13–17)
IMM GRANULOCYTES NFR BLD AUTO: 0.2 % — SIGNIFICANT CHANGE UP (ref 0–1.5)
LYMPHOCYTES # BLD AUTO: 2.16 K/UL — SIGNIFICANT CHANGE UP (ref 1–3.3)
LYMPHOCYTES # BLD AUTO: 35.9 % — SIGNIFICANT CHANGE UP (ref 13–44)
MCHC RBC-ENTMCNC: 32.1 PG — SIGNIFICANT CHANGE UP (ref 27–34)
MCHC RBC-ENTMCNC: 33.5 GM/DL — SIGNIFICANT CHANGE UP (ref 32–36)
MCV RBC AUTO: 96 FL — SIGNIFICANT CHANGE UP (ref 80–100)
MONOCYTES # BLD AUTO: 0.7 K/UL — SIGNIFICANT CHANGE UP (ref 0–0.9)
MONOCYTES NFR BLD AUTO: 11.6 % — SIGNIFICANT CHANGE UP (ref 2–14)
NEUTROPHILS # BLD AUTO: 2.97 K/UL — SIGNIFICANT CHANGE UP (ref 1.8–7.4)
NEUTROPHILS NFR BLD AUTO: 49.4 % — SIGNIFICANT CHANGE UP (ref 43–77)
NRBC # BLD: 0 /100 WBCS — SIGNIFICANT CHANGE UP (ref 0–0)
PLATELET # BLD AUTO: 218 K/UL — SIGNIFICANT CHANGE UP (ref 150–400)
POTASSIUM SERPL-MCNC: 3.7 MMOL/L — SIGNIFICANT CHANGE UP (ref 3.5–5.3)
POTASSIUM SERPL-SCNC: 3.7 MMOL/L — SIGNIFICANT CHANGE UP (ref 3.5–5.3)
PROT SERPL-MCNC: 7.1 G/DL — SIGNIFICANT CHANGE UP (ref 6.4–8.2)
RAPID RVP RESULT: SIGNIFICANT CHANGE UP
RBC # BLD: 4.48 M/UL — SIGNIFICANT CHANGE UP (ref 4.2–5.8)
RBC # FLD: 12.8 % — SIGNIFICANT CHANGE UP (ref 10.3–14.5)
S PYO AG SPEC QL IA: NEGATIVE — SIGNIFICANT CHANGE UP
SARS-COV-2 RNA SPEC QL NAA+PROBE: SIGNIFICANT CHANGE UP
SODIUM SERPL-SCNC: 143 MMOL/L — SIGNIFICANT CHANGE UP (ref 132–145)
TROPONIN I SERPL-MCNC: <0.017 NG/ML — LOW (ref 0.02–0.06)
WBC # BLD: 6.01 K/UL — SIGNIFICANT CHANGE UP (ref 3.8–10.5)
WBC # FLD AUTO: 6.01 K/UL — SIGNIFICANT CHANGE UP (ref 3.8–10.5)

## 2021-09-27 PROCEDURE — 71046 X-RAY EXAM CHEST 2 VIEWS: CPT | Mod: 26

## 2021-09-27 PROCEDURE — 99284 EMERGENCY DEPT VISIT MOD MDM: CPT

## 2021-09-27 RX ORDER — SODIUM CHLORIDE 9 MG/ML
1000 INJECTION INTRAMUSCULAR; INTRAVENOUS; SUBCUTANEOUS ONCE
Refills: 0 | Status: COMPLETED | OUTPATIENT
Start: 2021-09-27 | End: 2021-09-27

## 2021-09-27 RX ORDER — DEXAMETHASONE 0.5 MG/5ML
6 ELIXIR ORAL DAILY
Refills: 0 | Status: DISCONTINUED | OUTPATIENT
Start: 2021-09-27 | End: 2021-10-01

## 2021-09-27 RX ADMIN — Medication 6 MILLIGRAM(S): at 20:01

## 2021-09-27 NOTE — ED PROVIDER NOTE - PATIENT PORTAL LINK FT
You can access the FollowMyHealth Patient Portal offered by [facility] by registering at the following website: [email or web address]. By joining [employer]’s FollowMyHealth portal, you will also be able to view your health information using other applications (apps) compatible with our system.

## 2021-09-27 NOTE — ED ADULT NURSE NOTE - NSIMPLEMENTINTERV_GEN_ALL_ED
Implemented All Universal Safety Interventions:  [place] to call system. Call bell, personal items and telephone within reach. Instruct patient to call for assistance. Room bathroom lighting operational. Non-slip footwear when patient is off stretcher. Physically safe environment: no spills, clutter or unnecessary equipment. Stretcher in lowest position, wheels locked, appropriate side rails in place.

## 2021-09-27 NOTE — ED ADULT NURSE NOTE - NSICDXPASTMEDICALHX_GEN_ALL_CORE_FT
- - -
PAST MEDICAL HISTORY:  HIV (human immunodeficiency virus infection)     Pulmonary embolism

## 2021-09-27 NOTE — ED PROVIDER NOTE - OBJECTIVE STATEMENT
PMHX HIV on genvoya Tcell 600, vl undetectable, PUD on famatodine, took pfizor covid booster 9/4/21 presents with sorethroat, chest tightness, and SOB x one week. able to tolerate PO's, denies diaphoresis, LOC, abdominal pain. also noted to have loose stools x 2 weeks. had remote hx of PE in 2010, no longer on AC.

## 2021-09-27 NOTE — ED PROVIDER NOTE - CLINICAL SUMMARY MEDICAL DECISION MAKING FREE TEXT BOX
PMHX HIV, remote hx of PE presents with nasal congestion, throat pain, chest tightness x one week. received COVID booster 3 weeks ago. will check labs, CXR, and continue to monitor

## 2021-09-28 LAB
CULTURE RESULTS: SIGNIFICANT CHANGE UP
SPECIMEN SOURCE: SIGNIFICANT CHANGE UP

## 2021-09-30 RX ORDER — PENICILLIN V POTASIUM 500 MG/1
1 TABLET OROPHARYNGEAL
Qty: 30 | Refills: 0
Start: 2021-09-30 | End: 2021-10-09

## 2021-09-30 NOTE — ED POST DISCHARGE NOTE - DETAILS
Left VM to call us back to discuss his results. Pt called back 125pm on 9/30.  Discussed results and Rx'd Pen VK.  Pt demonstrates clear understanding of dx and tx course.

## 2022-08-12 NOTE — ED PROVIDER NOTE - EKG ADDITIONAL QUESTION - PERFORMED INDEPENDENT VISUALIZATION
Continued Stay Review    Observation 8/10 @ 0708 and changed to Inpatient on 8/12 @ 1457  Pt requiring continued stay for Facial cellulitis/ Iv antibiotics and Post op care    Start   Ordered     Inpatient Admission  Once        Transfer Service: Hospitalist       Question Answer   Level of Care Med Surg   Estimated length of stay More than 2 Midnights   Certification I certify that inpatient services are medically necessary for this patient for a duration of greater than two midnights  See H&P and MD Progress Notes for additional information about the patient's course of treatment  08/12/22 1457     Place in Observation  Once        Question: Level of Care Answer: Med Surg    08/10/22 2296          Date: 8/12                          Current Patient Class: Inpatient   Current Level of Care: Med Surg    HPI:32 y o  female initially admitted on 8/12    Assessment/Plan: Facial cellulitis, Pregnancy - 8 wks  Continues on Iv antibiotics  Pain control  8/21 OR - S/p Removal of complete bony impacted tooth number 32  Intraoral incision and drainage right dentoalveolar structure  Intraoral incision and drainage right submandibular space  Intraoral incision and drainage right  space  Operative Findings:  Significant amount of granulation tissue associated with completely impacted tooth number 32       8/14  Facial cellulitis with assoc abscess  S/p tooth extraction  Clinically improved on Iv antibiotics  Will transition oral Augmentin to complete course  Regular diet post op      Vital Signs:   08/13/22 07:23:14 98 5 °F (36 9 °C) 63 16 105/58 74 94 % -- --   08/12/22 22:45:33 98 4 °F (36 9 °C) 69 17 118/67 84 97 % -- --   08/12/22 2008 -- -- -- -- -- -- None (Room air)      /12/22 09:23:15 98 2 °F (36 8 °C) 60 15 115/76 89 94 % -- -- --   08/12/22 0915 98 7 °F (37 1 °C) 59 26 Abnormal  126/82 -- 98 % None (Room air) WDL --   08/12/22 0900 -- 73 29 Abnormal  126/58 84 100 % None (Room air) -- --   08/12/22 0845 -- 83 22 141/74 100 100 % None (Room air)       Pertinent Labs/Diagnostic Results:       Results from last 7 days   Lab Units 08/12/22  0440 08/11/22  0445 08/10/22  0206   WBC Thousand/uL 5 77 6 61 8 38   HEMOGLOBIN g/dL 8 2* 8 5* 8 8*   HEMATOCRIT % 26 5* 26 9* 29 0*   PLATELETS Thousands/uL 241 239 256   NEUTROS ABS Thousands/µL 2 89 4 14 5 60         Results from last 7 days   Lab Units 08/12/22  0440 08/11/22  0445 08/10/22  0206   SODIUM mmol/L 137 136 137   POTASSIUM mmol/L 3 8 3 4* 4 2   CHLORIDE mmol/L 104 103 103   CO2 mmol/L 23 23 25   ANION GAP mmol/L 10 10 9   BUN mg/dL 7 7 9   CREATININE mg/dL 0 66 0 61 0 64   EGFR ml/min/1 73sq m 118 122 120   CALCIUM mg/dL 8 5 8 3 8 7     Results from last 7 days   Lab Units 08/10/22  0206   AST U/L 21   ALT U/L 10*   ALK PHOS U/L 45*   TOTAL PROTEIN g/dL 7 7   ALBUMIN g/dL 3 2*   TOTAL BILIRUBIN mg/dL 0 31         Results from last 7 days   Lab Units 08/12/22  0440 08/11/22  0445 08/10/22  0206   GLUCOSE RANDOM mg/dL 84 85 83       Results from last 7 days   Lab Units 08/10/22  0206   LACTIC ACID mmol/L 0 6       Medications:   Scheduled Medications:  ampicillin-sulbactam, 3 g, Intravenous, Q6H  ferrous sulfate, 325 mg, Oral, Daily With Breakfast      Continuous IV Infusions: None     PRN Meds:  acetaminophen, 650 mg, Oral, Q6H PRN 8/12 x2    ibuprofen (MOTRIN) tablet 400 mg  Dose: 400 mg  Freq: Every 6 hours PRN Route: PO 8/13 x2  PRN Reasons: moderate pain,severe pain  Start: 08/12/22 2327     Discharge Plan: D    Network Utilization Review Department  ATTENTION: Please call with any questions or concerns to [phone] and carefully listen to the prompts so that you are directed to the right person  All voicemails are confidential   [facility] all requests for admission clinical reviews, approved or denied determinations and any other requests to dedicated fax number below belonging to the campus where the patient is receiving treatment   List of dedicated fax numbers for the Facilities:  [address] [place] [address] DENIALS (Administrative/Medical Necessity) [phone]   [address] (Maternity/NICU/Pediatrics) 261 [facility],7Th Floor [address]  [address] [place] [address] [place] [address] [place] [address] [address] [facility] [place] [address] [phone] Yes

## 2022-10-03 ENCOUNTER — EMERGENCY (EMERGENCY)
Facility: HOSPITAL | Age: 52
LOS: 1 days | Discharge: HOME | End: 2022-10-03
Admitting: EMERGENCY MEDICINE

## 2022-10-03 VITALS
HEIGHT: 70 IN | HEART RATE: 101 BPM | WEIGHT: 136.69 LBS | DIASTOLIC BLOOD PRESSURE: 90 MMHG | SYSTOLIC BLOOD PRESSURE: 120 MMHG | RESPIRATION RATE: 15 BRPM | TEMPERATURE: 98 F | OXYGEN SATURATION: 99 %

## 2022-10-03 VITALS
HEART RATE: 89 BPM | SYSTOLIC BLOOD PRESSURE: 118 MMHG | TEMPERATURE: 98 F | RESPIRATION RATE: 16 BRPM | DIASTOLIC BLOOD PRESSURE: 82 MMHG | OXYGEN SATURATION: 98 %

## 2022-10-03 PROCEDURE — 99284 EMERGENCY DEPT VISIT MOD MDM: CPT

## 2022-10-03 PROCEDURE — 76870 US EXAM SCROTUM: CPT | Mod: 26

## 2022-10-03 RX ORDER — AZITHROMYCIN 500 MG/1
1000 TABLET, FILM COATED ORAL ONCE
Refills: 0 | Status: COMPLETED | OUTPATIENT
Start: 2022-10-03 | End: 2022-10-03

## 2022-10-03 RX ORDER — PENICILLIN G BENZATHINE 1200000 [IU]/2ML
2.4 INJECTION, SUSPENSION INTRAMUSCULAR ONCE
Refills: 0 | Status: COMPLETED | OUTPATIENT
Start: 2022-10-03 | End: 2022-10-03

## 2022-10-03 RX ORDER — CEFTRIAXONE 500 MG/1
500 INJECTION, POWDER, FOR SOLUTION INTRAMUSCULAR; INTRAVENOUS ONCE
Refills: 0 | Status: COMPLETED | OUTPATIENT
Start: 2022-10-03 | End: 2022-10-03

## 2022-10-03 RX ADMIN — AZITHROMYCIN 1000 MILLIGRAM(S): 500 TABLET, FILM COATED ORAL at 18:16

## 2022-10-03 RX ADMIN — CEFTRIAXONE 500 MILLIGRAM(S): 500 INJECTION, POWDER, FOR SOLUTION INTRAMUSCULAR; INTRAVENOUS at 18:16

## 2022-10-03 RX ADMIN — PENICILLIN G BENZATHINE 2.4 MILLION UNIT(S): 1200000 INJECTION, SUSPENSION INTRAMUSCULAR at 18:16

## 2022-10-03 NOTE — ED PROVIDER NOTE - NEUROLOGICAL, MLM
Your surgery is scheduled for 4/13/22.    Please report to Hospital [facility] on the 1st Floor at 0615 a.m.    THIS TIME IS SUBJECT TO CHANGE.  YOU WILL RECEIVE A PHONE CALL THE DAY BEFORE SURGERY BY 3:30 PM TO CONFIRM YOUR TIME OF ARRIVAL.  IF YOU HAVE NOT RECEIVED A PHONE CALL BY 3:30 PM THE DAY BEFORE YOUR SURGERY PLEASE CALL [phone]     INSTRUCTIONS IMPORTANT!!!  ¨ Do not eat or drink after 12 midnight-including water, candy, gum, & mints. OK to brush teeth.      ____  Proceed to [facility] on *** for additional testing.        ____  Do not wear makeup, including mascara.  ____  No powder, lotions or creams to surgical area.  ____  Please remove all jewelry, including piercings and leave at home.  ____  No money or valuables needed. Please leave at home.  ____  Please bring any documents given by your doctor.  ____  If going home the same day, arrange for a ride home. You will not be able to             drive if Anesthesia was used.  ____  Children under 18 years require a parent / guardian present the entire time             they are in surgery / recovery.  ____  Wear loose fitting clothing. Allow for dressings, bandages.  ____  Stop Aspirin, Ibuprofen, Motrin, Aleve, Goody's/BC powders, Excedrine and Naproxen (NSAIDS) at least 3-5 days before surgery, unless otherwise instructed by your doctor, or the nurse.   You MAY use Tylenol/acetaminophen until day of surgery.  ____  Wash the surgical area with Hibiclens the night before surgery, and again the             morning of surgery.  Be sure to rinse hibiclens off completely (if instructed by   nurse).  ____  If you take diabetic medication, do not take am of surgery unless instructed by Doctor.  ____  Call MD for temperature above 101 degrees or any other signs of infection such as Urinary (bladder) infection, Upper respiratory infection, skin boils, etc.  ____ Stop taking any Fish Oil supplement or any Vitamins that contain Vitamin E at  least 5 days prior to surgery.  ____ Do Not wear your contact lenses the day of your procedure.  You may wear your glasses.      ____Do not shave surgical site for 3 days prior to surgery.  ____ Practice Good hand washing before, during, and after procedure.      I have read or had read and explained to me, and understand the above information.  Additional comments or instructions:  For additional questions call [phone]      ANESTHESIA SIDE EFFECTS  -For the first 24 hours after surgery:  Do not drive, use heavy equipment, make important decisions, or drink alcohol  -It is normal to feel sleepy for several hours.  Rest until you are more awake.  -Have someone stay with you, if needed.  They can watch for problems and help keep you safe.  -Some possible post anesthesia side effects include: nausea and vomiting, sore throat and hoarseness, sleepiness, and dizziness.        Pre-Op Bathing Instructions    Before surgery, you can play an important role in your own health.    Because skin is not sterile, we need to be sure that your skin is as free of germs as possible. By following the instructions below, you can reduce the number of germs on your skin before surgery.    IMPORTANT: You will need to shower with a special soap called Hibiclens*, available at any pharmacy.  If you are allergic to Chlorhexidine (the antiseptic in Hibiclens), use an antibacterial soap such as Dial Soap for your preoperative shower.  You will shower with Hibiclens both the night before your surgery and the morning of your surgery.  Do not use Hibiclens on the head, face or genitals to avoid injury to those areas.    STEP #1: THE NIGHT BEFORE YOUR SURGERY     Do not shave the area of your body where your surgery will be performed.  Shower and wash your hair and body as usual with your normal soap and shampoo.  Rinse your hair and body thoroughly after you shower to remove all soap residue.  With your hand, apply one packet of Hibiclens soap to  the surgical site.   Wash the site gently for five (5) minutes. Do not scrub your skin too hard.   Do not wash with your regular soap after Hibiclens is used.  Rinse your body thoroughly.  Pat yourself dry with a clean, soft towel.  Do not use lotion, cream, or powder.  Wear clean clothes.    STEP #2: THE MORNING OF YOUR SURGERY     Repeat Step #1.    * Not to be used by people allergic to Chlorhexidine.     Alert and oriented, no focal deficits, no motor or sensory deficits.

## 2022-10-03 NOTE — ED PROVIDER NOTE - OBJECTIVE STATEMENT
53 y/o male here with testicular pain for weeks stating his partner was recently diagnose with syphilis. Patient appears well NAD stable. Denies fever, chills, abdominal pain, change in bowel function, flank pain, rash, HA, dizziness, SOB, CP, palpitations, diaphoresis, cough, and malaise.

## 2022-10-03 NOTE — ED ADULT TRIAGE NOTE - SOURCE OF INFORMATION
Dinesh Thornton  1962     Office/Outpatient Visit    Visit Date: Wed, Dec 4, 2019 04:02 pm    Provider: Pepe Aguilar MD (Assistant: Lauren Govea MA)    Location: [facility]        Electronically signed by Pepe Aguilar MD on  12/05/2019 03:06:53 PM                             Subjective:        CC: Mr. Thornton is a 57 year old White male.  This is a follow-up visit.  discuss changing citalpram         HPI:           Patient to be evaluated for dysthymic disorder.  The diagnosis of depression was made several weeks ago.  Presently, he feels a moderate degree of depression.  Currently not on any antidepressants.  Current affective symptoms include anhedonia, anxious mood, a change in appetite, altered sleep habits and decreased ability to concentrate.  The symptoms are constant and overwhelming.  Presently, Mr. Thornton denies suicidal ideation.  Recent stressors include SON MOVED AWAY FROM  HOME.  Psychiatric history is significant for generalized anxiety disorder.  CELEXA MADE HIM FEEL WORSE.  WANTS TO TRY ZOLOFT.  SMALL DOSE OF HIS WIFE'S VALIUM HELPED FOR A LITTLE WHILE.  HAS TAKEN XANAX IN THE PAST     ROS:     CONSTITUTIONAL:  Negative for chills and fever.      CARDIOVASCULAR:  Negative for chest pain, palpitations and pedal edema.      RESPIRATORY:  Positive for SNORES, NEG SLEEP STUDY.   Negative for recent cough or dyspnea.      GASTROINTESTINAL:  Negative for abdominal pain, anorexia, constipation, diarrhea, nausea and vomiting.          Past Medical History / Family History / Social History:         Last Reviewed on 12/05/2019 03:03 PM by Pepe Aguilar    Past Medical History:             PREVENTIVE HEALTH MAINTENANCE             COLORECTAL CANCER SCREENING: Up to date (colonoscopy q10y; sigmoidoscopy q5y; Cologuard q3y) was last done 2014, Results are in chart; colonoscopy with normal results     DENTAL CLEANING: was last done 2019     EYE EXAM: was last done  2019     Hepatitis C Medicare Screening: was last done 2019     PSA: was last done 2018 with normal results         PAST MEDICAL HISTORY             CURRENT MEDICAL PROVIDERS:    Dermatologist         Surgical History:         Vasectomy     R KNEE ARTHROSCOPY;         Family History:         Positive for Hypertension.      Positive for Type 2 Diabetes.          Social History:     Occupation: [employer];     Marital Status:      Children: 2 children         Tobacco/Alcohol/Supplements:     Last Reviewed on 12/05/2019 03:03 PM by Pepe Aguilar    Tobacco: He has never smoked.          Alcohol: Frequency: Daily When he drinks, the average quantity of alcohol is ONE drinks.   He typically consumes beer.      Caffeine:  He admits to consuming caffeine via coffee ( 1 serving per day ) and tea ( 1 serving per day ).          Substance Abuse History:     Last Reviewed on 12/05/2019 03:03 PM by Pepe Aguilar    NEGATIVE         Current Problems:     Last Reviewed on 12/05/2019 03:03 PM by Pepe Aguilar    Essential (primary) hypertension    HTN    Encounter for screening for malignant neoplasm of prostate    Encounter for screening for cardiovascular disorders    Screening for prostate cancer    Screening for ischemic heart disease    Screening for depression    Encounter for screening for other disorder    Rosacea    Encounter for screening for depression    Dysthymic disorder        Immunizations:     Influenza, injectable, MDCK, preservative free 10/11/2019    Fluvirin (4 + years dose) 10/10/2016    Fluzone (3 + years dose) 10/17/2017    Influenza Virus Vaccine, unspecified formulation 10/1/2018        Allergies:     Last Reviewed on 12/05/2019 03:03 PM by Pepe Aguilar    No Known Allergies.        Current Medications:     Last Reviewed on 12/05/2019 03:03 PM by Pepe Aguilar    Lisinopril 20 mg oral tablet [1 tab daily]    Atenolol 25 mg oral tablet [1 tab  Patient daily PRN]    Metronidazole 1 % Topical Cream        Objective:        Vitals:         Current: 12/4/2019 4:07:07 PM    Ht:  6 ft, 5.25 in;  Wt: 192.8 lbs;  BMI: 22.7T: 98.7 F (oral);  BP: 122/70 mm Hg (left arm, sitting);  P: 101 bpm (left arm (BP Cuff), sitting);  sCr: 0.92 mg/dL;  GFR: 97.08        Exams:     PHYSICAL EXAM:     GENERAL: Vitals recorded well developed, well nourished;  well groomed;  no apparent distress;     NEUROLOGIC: GROSSLY INTACT     PSYCHIATRIC:  appropriate affect and demeanor; normal speech pattern; grossly normal memory;         Assessment:         F34.1   Dysthymic disorder           ORDERS:         Meds Prescribed:       [New Rx] sertraline 50 mg oral tablet [take 1 tablet (50 mg) by oral route once daily], #30 (thirty) tablets, Refills: 0 (zero)                 Plan:         Dysthymic disorder        MEDICATIONS: NEW prescriptions an antidepressant     RECOMMENDATIONS given include: CONSIDER A SMALL DOSE OF XANAX AGAIN.      FOLLOW-UP: BY PHONE IN A COOUPLE OF WEEKS     DECLINES COUNSELING AT THIS TIME           Prescriptions:       [New Rx] sertraline 50 mg oral tablet [take 1 tablet (50 mg) by oral route once daily], #30 (thirty) tablets, Refills: 0 (zero)             Charge Capture:         Primary Diagnosis:     F34.1  Dysthymic disorder           Orders:      05591  Office/outpatient visit; established patient, level 2  (In-House)

## 2022-10-03 NOTE — ED PROVIDER NOTE - CLINICAL SUMMARY MEDICAL DECISION MAKING FREE TEXT BOX
Patient here with testicular pain x 3 weeks also stating his partner tested positive for syphilis.  Patient exam unremarkable   PLan: US and labs   treatment for STI   pili return precautions

## 2022-10-03 NOTE — ED PROVIDER NOTE - PATIENT PORTAL LINK FT
You can access the FollowMyHealth Patient Portal offered by [facility] by registering at the following website: [email or web address]. By joining [facility]’s FollowMyHealth portal, you will also be able to view your health information using other applications (apps) compatible with our system.

## 2022-10-04 LAB
C TRACH RRNA SPEC QL NAA+PROBE: SIGNIFICANT CHANGE UP
N GONORRHOEA RRNA SPEC QL NAA+PROBE: SIGNIFICANT CHANGE UP

## 2022-10-05 LAB
RPR SER-TITR: SIGNIFICANT CHANGE UP
RPR SERPL-ACNC: SIGNIFICANT CHANGE UP
T PALLIDUM AB TITR SER: POSITIVE
T PALLIDUM IGG SER QL IF: REACTIVE

## 2022-10-07 DIAGNOSIS — B20 HUMAN IMMUNODEFICIENCY VIRUS [HIV] DISEASE: ICD-10-CM

## 2022-10-07 DIAGNOSIS — Z86.711 PERSONAL HISTORY OF PULMONARY EMBOLISM: ICD-10-CM

## 2022-10-07 DIAGNOSIS — N50.819 TESTICULAR PAIN, UNSPECIFIED: ICD-10-CM

## 2022-10-16 ENCOUNTER — EMERGENCY (EMERGENCY)
Facility: HOSPITAL | Age: 52
LOS: 1 days | Discharge: LEFT BEFORE DISCHARGE | End: 2022-10-16
Admitting: EMERGENCY MEDICINE

## 2022-10-16 VITALS
SYSTOLIC BLOOD PRESSURE: 119 MMHG | OXYGEN SATURATION: 97 % | HEART RATE: 109 BPM | DIASTOLIC BLOOD PRESSURE: 79 MMHG | TEMPERATURE: 98 F | RESPIRATION RATE: 16 BRPM

## 2022-10-16 VITALS
WEIGHT: 149.91 LBS | HEIGHT: 70 IN | RESPIRATION RATE: 18 BRPM | HEART RATE: 128 BPM | TEMPERATURE: 98 F | OXYGEN SATURATION: 98 % | DIASTOLIC BLOOD PRESSURE: 84 MMHG | SYSTOLIC BLOOD PRESSURE: 116 MMHG

## 2022-10-16 DIAGNOSIS — Z20.822 CONTACT WITH AND (SUSPECTED) EXPOSURE TO COVID-19: ICD-10-CM

## 2022-10-16 DIAGNOSIS — Z86.711 PERSONAL HISTORY OF PULMONARY EMBOLISM: ICD-10-CM

## 2022-10-16 DIAGNOSIS — R10.31 RIGHT LOWER QUADRANT PAIN: ICD-10-CM

## 2022-10-16 DIAGNOSIS — R11.2 NAUSEA WITH VOMITING, UNSPECIFIED: ICD-10-CM

## 2022-10-16 DIAGNOSIS — Z53.29 PROCEDURE AND TREATMENT NOT CARRIED OUT BECAUSE OF PATIENT'S DECISION FOR OTHER REASONS: ICD-10-CM

## 2022-10-16 DIAGNOSIS — B20 HUMAN IMMUNODEFICIENCY VIRUS [HIV] DISEASE: ICD-10-CM

## 2022-10-16 LAB
ALBUMIN SERPL ELPH-MCNC: 3.9 G/DL — SIGNIFICANT CHANGE UP (ref 3.4–5)
ALP SERPL-CCNC: 138 U/L — HIGH (ref 40–120)
ALT FLD-CCNC: 35 U/L — SIGNIFICANT CHANGE UP (ref 12–42)
ANION GAP SERPL CALC-SCNC: 5 MMOL/L — LOW (ref 9–16)
APPEARANCE UR: CLEAR — SIGNIFICANT CHANGE UP
AST SERPL-CCNC: 38 U/L — HIGH (ref 15–37)
BASOPHILS # BLD AUTO: 0.03 K/UL — SIGNIFICANT CHANGE UP (ref 0–0.2)
BASOPHILS NFR BLD AUTO: 0.3 % — SIGNIFICANT CHANGE UP (ref 0–2)
BILIRUB SERPL-MCNC: 1.2 MG/DL — SIGNIFICANT CHANGE UP (ref 0.2–1.2)
BILIRUB UR-MCNC: NEGATIVE — SIGNIFICANT CHANGE UP
BUN SERPL-MCNC: 17 MG/DL — SIGNIFICANT CHANGE UP (ref 7–23)
CALCIUM SERPL-MCNC: 8.9 MG/DL — SIGNIFICANT CHANGE UP (ref 8.5–10.5)
CHLORIDE SERPL-SCNC: 100 MMOL/L — SIGNIFICANT CHANGE UP (ref 96–108)
CO2 SERPL-SCNC: 30 MMOL/L — SIGNIFICANT CHANGE UP (ref 22–31)
COLOR SPEC: YELLOW — SIGNIFICANT CHANGE UP
CREAT SERPL-MCNC: 0.8 MG/DL — SIGNIFICANT CHANGE UP (ref 0.5–1.3)
DIFF PNL FLD: NEGATIVE — SIGNIFICANT CHANGE UP
EGFR: 108 ML/MIN/1.73M2 — SIGNIFICANT CHANGE UP
EOSINOPHIL # BLD AUTO: 0.12 K/UL — SIGNIFICANT CHANGE UP (ref 0–0.5)
EOSINOPHIL NFR BLD AUTO: 1.2 % — SIGNIFICANT CHANGE UP (ref 0–6)
ETHANOL SERPL-MCNC: <3 MG/DL — SIGNIFICANT CHANGE UP
GLUCOSE SERPL-MCNC: 115 MG/DL — HIGH (ref 70–99)
GLUCOSE UR QL: NEGATIVE — SIGNIFICANT CHANGE UP
HCT VFR BLD CALC: 46.5 % — SIGNIFICANT CHANGE UP (ref 39–50)
HGB BLD-MCNC: 15.6 G/DL — SIGNIFICANT CHANGE UP (ref 13–17)
IMM GRANULOCYTES NFR BLD AUTO: 0.5 % — SIGNIFICANT CHANGE UP (ref 0–0.9)
INR BLD: 1.01 — SIGNIFICANT CHANGE UP (ref 0.88–1.16)
KETONES UR-MCNC: ABNORMAL MG/DL
LACTATE SERPL-SCNC: 0.7 MMOL/L — SIGNIFICANT CHANGE UP (ref 0.4–2)
LEUKOCYTE ESTERASE UR-ACNC: NEGATIVE — SIGNIFICANT CHANGE UP
LIDOCAIN IGE QN: 232 U/L — SIGNIFICANT CHANGE UP (ref 73–393)
LYMPHOCYTES # BLD AUTO: 0.94 K/UL — LOW (ref 1–3.3)
LYMPHOCYTES # BLD AUTO: 9.6 % — LOW (ref 13–44)
MAGNESIUM SERPL-MCNC: 1.9 MG/DL — SIGNIFICANT CHANGE UP (ref 1.6–2.6)
MCHC RBC-ENTMCNC: 32.1 PG — SIGNIFICANT CHANGE UP (ref 27–34)
MCHC RBC-ENTMCNC: 33.5 GM/DL — SIGNIFICANT CHANGE UP (ref 32–36)
MCV RBC AUTO: 95.7 FL — SIGNIFICANT CHANGE UP (ref 80–100)
MONOCYTES # BLD AUTO: 0.9 K/UL — SIGNIFICANT CHANGE UP (ref 0–0.9)
MONOCYTES NFR BLD AUTO: 9.2 % — SIGNIFICANT CHANGE UP (ref 2–14)
NEUTROPHILS # BLD AUTO: 7.71 K/UL — HIGH (ref 1.8–7.4)
NEUTROPHILS NFR BLD AUTO: 79.2 % — HIGH (ref 43–77)
NITRITE UR-MCNC: NEGATIVE — SIGNIFICANT CHANGE UP
NRBC # BLD: 0 /100 WBCS — SIGNIFICANT CHANGE UP (ref 0–0)
PH UR: 7.5 — SIGNIFICANT CHANGE UP (ref 5–8)
PLATELET # BLD AUTO: 208 K/UL — SIGNIFICANT CHANGE UP (ref 150–400)
POTASSIUM SERPL-MCNC: 4.6 MMOL/L — SIGNIFICANT CHANGE UP (ref 3.5–5.3)
POTASSIUM SERPL-SCNC: 4.6 MMOL/L — SIGNIFICANT CHANGE UP (ref 3.5–5.3)
PROT SERPL-MCNC: 7.8 G/DL — SIGNIFICANT CHANGE UP (ref 6.4–8.2)
PROT UR-MCNC: NEGATIVE MG/DL — SIGNIFICANT CHANGE UP
PROTHROM AB SERPL-ACNC: 11.8 SEC — SIGNIFICANT CHANGE UP (ref 10.5–13.4)
RBC # BLD: 4.86 M/UL — SIGNIFICANT CHANGE UP (ref 4.2–5.8)
RBC # FLD: 13.1 % — SIGNIFICANT CHANGE UP (ref 10.3–14.5)
SARS-COV-2 RNA SPEC QL NAA+PROBE: SIGNIFICANT CHANGE UP
SODIUM SERPL-SCNC: 135 MMOL/L — SIGNIFICANT CHANGE UP (ref 132–145)
SP GR SPEC: 1.01 — SIGNIFICANT CHANGE UP (ref 1–1.03)
UROBILINOGEN FLD QL: 1 E.U./DL — SIGNIFICANT CHANGE UP
WBC # BLD: 9.75 K/UL — SIGNIFICANT CHANGE UP (ref 3.8–10.5)
WBC # FLD AUTO: 9.75 K/UL — SIGNIFICANT CHANGE UP (ref 3.8–10.5)

## 2022-10-16 PROCEDURE — 76870 US EXAM SCROTUM: CPT | Mod: 26

## 2022-10-16 PROCEDURE — 74176 CT ABD & PELVIS W/O CONTRAST: CPT | Mod: 26

## 2022-10-16 PROCEDURE — 99285 EMERGENCY DEPT VISIT HI MDM: CPT

## 2022-10-16 RX ORDER — ONDANSETRON 8 MG/1
4 TABLET, FILM COATED ORAL ONCE
Refills: 0 | Status: COMPLETED | OUTPATIENT
Start: 2022-10-16 | End: 2022-10-16

## 2022-10-16 RX ORDER — MORPHINE SULFATE 50 MG/1
4 CAPSULE, EXTENDED RELEASE ORAL ONCE
Refills: 0 | Status: DISCONTINUED | OUTPATIENT
Start: 2022-10-16 | End: 2022-10-16

## 2022-10-16 RX ORDER — SODIUM CHLORIDE 9 MG/ML
1000 INJECTION INTRAMUSCULAR; INTRAVENOUS; SUBCUTANEOUS ONCE
Refills: 0 | Status: COMPLETED | OUTPATIENT
Start: 2022-10-16 | End: 2022-10-16

## 2022-10-16 RX ORDER — CEFTRIAXONE 500 MG/1
1000 INJECTION, POWDER, FOR SOLUTION INTRAMUSCULAR; INTRAVENOUS ONCE
Refills: 0 | Status: COMPLETED | OUTPATIENT
Start: 2022-10-16 | End: 2022-10-16

## 2022-10-16 RX ADMIN — MORPHINE SULFATE 4 MILLIGRAM(S): 50 CAPSULE, EXTENDED RELEASE ORAL at 14:57

## 2022-10-16 RX ADMIN — SODIUM CHLORIDE 1000 MILLILITER(S): 9 INJECTION INTRAMUSCULAR; INTRAVENOUS; SUBCUTANEOUS at 14:58

## 2022-10-16 RX ADMIN — CEFTRIAXONE 100 MILLIGRAM(S): 500 INJECTION, POWDER, FOR SOLUTION INTRAMUSCULAR; INTRAVENOUS at 14:58

## 2022-10-16 RX ADMIN — ONDANSETRON 4 MILLIGRAM(S): 8 TABLET, FILM COATED ORAL at 14:57

## 2022-10-16 RX ADMIN — SODIUM CHLORIDE 1000 MILLILITER(S): 9 INJECTION INTRAMUSCULAR; INTRAVENOUS; SUBCUTANEOUS at 16:16

## 2022-10-16 NOTE — ED PROVIDER NOTE - OBJECTIVE STATEMENT
52-year-old male past medical history of PE, HIV, on ART, undetectable, no prior abdominal surgeries, presents to the ED with complaints of right lower quadrant abdominal cramping x 2 weeks with associated nausea, vomiting, diarrhea x2 days.  NBNB emesis and stool.  He endorses pain radiating into his right testicle.  He states the pain started intermittently and has increased in frequency and intensity.    Pt denies trauma/falls, fevers/chills, neck or back pain, headache, visual changes, sore throat, chest pain, palpitations, cough, SOB, dysuria, hematuria, weakness, dizziness, numbness, lower extremity swelling, rash, sick contacts, recent hospitalizations, recent travels.     Of note, patient received the influenza, shingles, and pneumonia vaccine yesterday.

## 2022-10-16 NOTE — ED PROVIDER NOTE - PHYSICAL EXAMINATION
Constitutional:  Well appearing, awake, alert, oriented to person, place, time/situation and in no apparent distress  Head:  NC/AT, symmetric, neck supple  ENMT: Airway patent, nasal mucosa clear, mouth with normal mucosa, throat has no vesicles, no oropharyngeal exudates and uvula is midline  Eyes:  Clear bilaterally, pupils equal, round and reactive to light  Cardiac:  Normal rate, regular rhythm. Heart sounds S1,S2.  No murmurs, rubs or gallops.  No JVD.  Respiratory:  Breath sounds clear and equal bilaterally  GI:   Abd soft, + guarding, migratory pain to palpation  :  No CVAT, + R testicular ttp, + cremasteric reflex bilat  MSK:  Spine appears normal, range of motion is not limited, no muscle or joint tenderness  Neuro:  Alert and oriented, no focal deficits, no motor or sensory deficits  Skin:  Skin normal color for race, warm, dry and intact.  No evidence of rash  Psych:  Normal mood and affect, no apparent risk to self or others.  Heme:  No adenopathy or splenomegaly.

## 2022-10-16 NOTE — ED ADULT TRIAGE NOTE - CHIEF COMPLAINT QUOTE
c/o RLQ pain with N/V/D x 2 weeks, worsening overnight. as per pt, yesterday received shingles, pneumonia, and infuenza vaccine yesterday. took ibuprofen prior to arrival.

## 2022-10-16 NOTE — ED ADULT NURSE NOTE - CAS TRG GEN SKIN CONDITION
Patient will discuss at appointment tomorrow. Lexapro was helping with the crying,but patient still with racing thoughts,heart palpitations,chest discomfort,insomnia. Cousin recently passed and mother in hospital with seizures and hallucinations. Patient feeling very overwhelmed .  Did not want to sart something tonight. Just JOSUE   Warm

## 2022-10-16 NOTE — ED ADULT NURSE NOTE - NSIMPLEMENTINTERV_GEN_ALL_ED
Implemented All Universal Safety Interventions:  [place] to call system. Call bell, personal items and telephone within reach. Instruct patient to call for assistance. Room bathroom lighting operational. Non-slip footwear when patient is off stretcher. Physically safe environment: no spills, clutter or unnecessary equipment. Stretcher in lowest position, wheels locked, appropriate side rails in place.

## 2022-10-16 NOTE — ED ADULT NURSE NOTE - NS ED NURSE ELOPE COMMENTS
IV removed. Patient walked out of the ER with steady gait. Stated he feels better and didn't want to wait for provider.

## 2022-10-16 NOTE — ED ADULT NURSE NOTE - OBJECTIVE STATEMENT
Patient with h/o HIV on medications presents with intermittent lower abdominal pain, mainly RLQ for 2 weeks. Patient started to have nausea, vomiting, and diarrhea (7 times today and it was watery brown stool) today. Also c/o right testicular pain and tenderness. Recently had the shingles, flu and pneumonia vaccine yesterday. Denies SOB, CP Patient with h/o HIV on medications presents with intermittent lower abdominal pain, mainly RLQ for 2 weeks. Patient started to have nausea, vomiting, and diarrhea, chills and weakness. (7 times today and it was watery brown stool) today. Also c/o right testicular pain and tenderness. Recently had the shingles, flu and pneumonia vaccine yesterday. Denies SOB, CP, fever, dizziness. Patient with h/o HIV on meds presents with intermittent lower abdominal pain, mainly RLQ for 2 weeks. Patient started to have nausea, vomiting, diarrhea, chills and weakness today  (7 times today and it was watery brown stool). Also c/o right testicular pain and tenderness. Recently had the shingles, flu and pneumonia vaccine yesterday. Denies SOB, CP, fever, dizziness, dysuria.

## 2022-10-16 NOTE — ED PROVIDER NOTE - CLINICAL SUMMARY MEDICAL DECISION MAKING FREE TEXT BOX
Improved with52-year-old male presents to the emergency department complaining of abdominal pain associated nausea vomiting and diarrhea.  Patient has low-grade temperature of 99.2 rectal and presents with tachycardia in the 120s.  Normotensive.  Given scrotal tenderness on exam will ultrasound to rule out testicular pathology and CT abdomen pelvis.

## 2022-10-16 NOTE — ED ADULT NURSE NOTE - CHPI ED NUR SYMPTOMS NEG
no blood in stool/no burning urination no blood in stool/no burning urination/no dysuria/no fever/no hematuria

## 2022-10-17 LAB
CULTURE RESULTS: SIGNIFICANT CHANGE UP
SPECIMEN SOURCE: SIGNIFICANT CHANGE UP

## 2022-10-21 LAB
CULTURE RESULTS: SIGNIFICANT CHANGE UP
CULTURE RESULTS: SIGNIFICANT CHANGE UP
SPECIMEN SOURCE: SIGNIFICANT CHANGE UP
SPECIMEN SOURCE: SIGNIFICANT CHANGE UP

## 2022-12-28 ENCOUNTER — EMERGENCY (EMERGENCY)
Facility: HOSPITAL | Age: 52
LOS: 1 days | Discharge: HOME | End: 2022-12-28
Admitting: EMERGENCY MEDICINE
Payer: MEDICARE

## 2022-12-28 VITALS — OXYGEN SATURATION: 99 % | HEART RATE: 75 BPM | RESPIRATION RATE: 14 BRPM

## 2022-12-28 VITALS
OXYGEN SATURATION: 95 % | DIASTOLIC BLOOD PRESSURE: 91 MMHG | TEMPERATURE: 97 F | SYSTOLIC BLOOD PRESSURE: 140 MMHG | RESPIRATION RATE: 16 BRPM | HEART RATE: 67 BPM | WEIGHT: 160.06 LBS

## 2022-12-28 PROCEDURE — 99053 MED SERV 10PM-8AM 24 HR FAC: CPT

## 2022-12-28 PROCEDURE — 99284 EMERGENCY DEPT VISIT MOD MDM: CPT

## 2022-12-28 NOTE — ED ADULT NURSE NOTE - CHIEF COMPLAINT QUOTE
BIBEMS for ams. as per friend , pt ingested GHB in a Cecilia water bottle unknowingly at approx 11pm. o2 sat 95% on RA. responsive to noxious stimuli but does not open eyes. snoring

## 2022-12-28 NOTE — ED ADULT TRIAGE NOTE - CHIEF COMPLAINT QUOTE
BIBEMS for ams. as per friend , pt ingested GHB in a Cecilia water bottle unknowingly at approx 11pm. o2 sat 95% on RA BIBEMS for ams. as per friend , pt ingested GHB in a Cecilia water bottle unknowingly at approx 11pm. o2 sat 95% on RA. responsive to noxious stimuli but does not open eyes. snoring

## 2022-12-28 NOTE — ED ADULT NURSE NOTE - OBJECTIVE STATEMENT
Patient presents to ED for ams s/p taking ghb. Per patient's friend, patient went to take a gulp of rosana water and instead it had GHB in it. Patient arrives breathing 95% on RA. Breathing irregular. Patient's friend Venu's phone number [phone] for pickup.

## 2022-12-28 NOTE — ED PROVIDER NOTE - CLINICAL SUMMARY MEDICAL DECISION MAKING FREE TEXT BOX
54 yo m no sig pmhx BIB EMS with ams after drinking excessive amount of GHB pt friend at bedside reports that pt called him telling him that he drank GHB from a bottle by mistake shortly pta, EMS on the scene report that pt had episodes of apnea but improves with stimulation and easily arousable in the ed. Spontaneous respirations with good air entry through out. No h/o opioid abuse and no pinpoint pupils.

## 2022-12-28 NOTE — ED PROVIDER NOTE - PATIENT PORTAL LINK FT
You can access the FollowMyHealth Patient Portal offered by [facility] by registering at the following website: [email or web address]. By joining [facility]’s FollowMyHealth portal, you will also be able to view your health information using other applications (apps) compatible with our system.

## 2022-12-28 NOTE — ED PROVIDER NOTE - OBJECTIVE STATEMENT
52 yo m no sig pmhx BIB EMS with ams after drinking excessive amount of GHB pt friend at bedside reports that pt called him telling him that he drank GHB from a bottle by mistake shortly pta, EMS on the scene report that pt had episodes of apnea but improves with stimulation and easily arousable in the ed. Spontaneous respirations with good air entry through out. No h/o opioid abuse and no pinpoint pupils.    I have reviewed available current nursing and previous documentation of past medical, surgical, family, and/or social history.

## 2022-12-28 NOTE — ED PROVIDER NOTE - NS ED MD DISPO DISCHARGE CCDA
Patient/Caregiver provided printed discharge information. IV pump/Cardiac Monitor/Defib/ACLS/Rescue Kit/O2/BVM/ventilator

## 2022-12-30 DIAGNOSIS — F13.20 SEDATIVE, HYPNOTIC OR ANXIOLYTIC DEPENDENCE, UNCOMPLICATED: ICD-10-CM

## 2023-06-09 ENCOUNTER — EMERGENCY (EMERGENCY)
Facility: HOSPITAL | Age: 53
LOS: 1 days | Discharge: HOME | End: 2023-06-09
Admitting: EMERGENCY MEDICINE
Payer: MEDICARE

## 2023-06-09 VITALS
TEMPERATURE: 98 F | HEART RATE: 113 BPM | SYSTOLIC BLOOD PRESSURE: 145 MMHG | WEIGHT: 149.91 LBS | RESPIRATION RATE: 18 BRPM | OXYGEN SATURATION: 98 % | DIASTOLIC BLOOD PRESSURE: 88 MMHG | HEIGHT: 68 IN

## 2023-06-09 VITALS
DIASTOLIC BLOOD PRESSURE: 88 MMHG | RESPIRATION RATE: 18 BRPM | TEMPERATURE: 98 F | SYSTOLIC BLOOD PRESSURE: 130 MMHG | HEART RATE: 100 BPM | OXYGEN SATURATION: 100 %

## 2023-06-09 DIAGNOSIS — Z86.718 PERSONAL HISTORY OF OTHER VENOUS THROMBOSIS AND EMBOLISM: ICD-10-CM

## 2023-06-09 DIAGNOSIS — L03.115 CELLULITIS OF RIGHT LOWER LIMB: ICD-10-CM

## 2023-06-09 DIAGNOSIS — M79.89 OTHER SPECIFIED SOFT TISSUE DISORDERS: ICD-10-CM

## 2023-06-09 DIAGNOSIS — R00.0 TACHYCARDIA, UNSPECIFIED: ICD-10-CM

## 2023-06-09 DIAGNOSIS — Z86.79 PERSONAL HISTORY OF OTHER DISEASES OF THE CIRCULATORY SYSTEM: ICD-10-CM

## 2023-06-09 DIAGNOSIS — L03.116 CELLULITIS OF LEFT LOWER LIMB: ICD-10-CM

## 2023-06-09 DIAGNOSIS — Z86.711 PERSONAL HISTORY OF PULMONARY EMBOLISM: ICD-10-CM

## 2023-06-09 DIAGNOSIS — B20 HUMAN IMMUNODEFICIENCY VIRUS [HIV] DISEASE: ICD-10-CM

## 2023-06-09 LAB
ANION GAP SERPL CALC-SCNC: 7 MMOL/L — LOW (ref 9–16)
BUN SERPL-MCNC: 19 MG/DL — SIGNIFICANT CHANGE UP (ref 7–23)
CALCIUM SERPL-MCNC: 8.7 MG/DL — SIGNIFICANT CHANGE UP (ref 8.5–10.5)
CHLORIDE SERPL-SCNC: 104 MMOL/L — SIGNIFICANT CHANGE UP (ref 96–108)
CO2 SERPL-SCNC: 27 MMOL/L — SIGNIFICANT CHANGE UP (ref 22–31)
CREAT SERPL-MCNC: 0.68 MG/DL — SIGNIFICANT CHANGE UP (ref 0.5–1.3)
D DIMER BLD IA.RAPID-MCNC: 209 NG/ML DDU — SIGNIFICANT CHANGE UP
EGFR: 113 ML/MIN/1.73M2 — SIGNIFICANT CHANGE UP
GLUCOSE SERPL-MCNC: 101 MG/DL — HIGH (ref 70–99)
HCT VFR BLD CALC: 41.4 % — SIGNIFICANT CHANGE UP (ref 39–50)
HGB BLD-MCNC: 14.2 G/DL — SIGNIFICANT CHANGE UP (ref 13–17)
MCHC RBC-ENTMCNC: 32.9 PG — SIGNIFICANT CHANGE UP (ref 27–34)
MCHC RBC-ENTMCNC: 34.3 GM/DL — SIGNIFICANT CHANGE UP (ref 32–36)
MCV RBC AUTO: 95.8 FL — SIGNIFICANT CHANGE UP (ref 80–100)
NRBC # BLD: 0 /100 WBCS — SIGNIFICANT CHANGE UP (ref 0–0)
NT-PROBNP SERPL-SCNC: 35 PG/ML — SIGNIFICANT CHANGE UP
PLATELET # BLD AUTO: SIGNIFICANT CHANGE UP K/UL (ref 150–400)
POTASSIUM SERPL-MCNC: 5.6 MMOL/L — HIGH (ref 3.5–5.3)
POTASSIUM SERPL-SCNC: 5.6 MMOL/L — HIGH (ref 3.5–5.3)
RBC # BLD: 4.32 M/UL — SIGNIFICANT CHANGE UP (ref 4.2–5.8)
RBC # FLD: 12.7 % — SIGNIFICANT CHANGE UP (ref 10.3–14.5)
SODIUM SERPL-SCNC: 138 MMOL/L — SIGNIFICANT CHANGE UP (ref 132–145)
TROPONIN I, HIGH SENSITIVITY RESULT: <4 NG/L — SIGNIFICANT CHANGE UP
WBC # BLD: SIGNIFICANT CHANGE UP K/UL (ref 3.8–10.5)
WBC # FLD AUTO: SIGNIFICANT CHANGE UP K/UL (ref 3.8–10.5)

## 2023-06-09 PROCEDURE — 93970 EXTREMITY STUDY: CPT | Mod: 26

## 2023-06-09 PROCEDURE — 99285 EMERGENCY DEPT VISIT HI MDM: CPT

## 2023-06-09 RX ORDER — CEFPODOXIME PROXETIL 100 MG
400 TABLET ORAL ONCE
Refills: 0 | Status: COMPLETED | OUTPATIENT
Start: 2023-06-09 | End: 2023-06-09

## 2023-06-09 RX ORDER — CEFPODOXIME PROXETIL 100 MG
2 TABLET ORAL
Qty: 40 | Refills: 0
Start: 2023-06-09 | End: 2023-06-18

## 2023-06-09 RX ADMIN — Medication 1 TABLET(S): at 13:09

## 2023-06-09 RX ADMIN — Medication 400 MILLIGRAM(S): at 13:08

## 2023-06-09 NOTE — ED ADULT NURSE NOTE - NSFALLUNIVINTERV_ED_ALL_ED
Bed/Stretcher in lowest position, wheels locked, appropriate side rails in place/Call bell, personal items and telephone in reach/Instruct patient to call for assistance before getting out of bed/chair/stretcher/Non-slip footwear applied when patient is off stretcher/[place] to call system/Physically safe environment - no spills, clutter or unnecessary equipment/Purposeful proactive rounding/Room/bathroom lighting operational, light cord in reach

## 2023-06-09 NOTE — ED ADULT TRIAGE NOTE - CHIEF COMPLAINT QUOTE
Pt walked in c/o bilateral leg swelling and redness x 5 days. Pt reports PMH of PE and DVT in 2015 and cellulitis to b/l legs. Denies chest pain, SOB, or ac use. Sent in from PCP to r/o DVT.

## 2023-06-09 NOTE — ED PROVIDER NOTE - CLINICAL SUMMARY MEDICAL DECISION MAKING FREE TEXT BOX
52 y/o Male with PMHx of HIV, PE, DVT, and peripheral and vascular disease presenting with leg swelling and redness for the past week. On exam patient was found to have 6cm of erythema to the medial aspect of the right calf. Will obtain labs, US duplex venous lower ext, EKG, and reassess. 52 y/o Male with PMHx of HIV, PE, DVT, and peripheral and vascular disease presenting with leg swelling and redness for the past week. On exam patient was found to have 6cm of erythema to the medial aspect of the right calf. Will obtain labs, US duplex venous lower ext, EKG, and reassess.    WBC unable to process as pt difficult stick but has no systemic symptoms, afebrile, cellulitis is small without crepitus or circumferential or overlying a joint, and does not require admission for IV abx. US negative. will d/c with abx and close PMD f/u.

## 2023-06-09 NOTE — ED ADULT NURSE NOTE - OBJECTIVE STATEMENT
reports bilateral leg swelling that began yesterday. denies pain/diffculty walking. reports recent kidney infection - discharged the other day from ED. Hx HIV.
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
## 2023-06-09 NOTE — ED PROVIDER NOTE - NSFOLLOWUPINSTRUCTIONS_ED_ALL_ED_FT
Cellulitis    WHAT YOU NEED TO KNOW:    Cellulitis is a skin infection caused by bacteria. Cellulitis may go away on its own or you may need treatment. Your healthcare provider may draw a [place] around the outside edges of your cellulitis. If your cellulitis spreads, your healthcare provider will see it outside of the [place]. Cellulitis          DISCHARGE INSTRUCTIONS:    Call 911 if:     You have sudden trouble breathing or chest pain.        Return to the emergency department if:     Your wound gets larger and more painful.       You feel a crackling under your skin when you touch it.      You have purple dots or bumps on your skin, or you see bleeding under your skin.      You have new swelling and pain in your legs.      The red, warm, swollen area gets larger.      You see red streaks coming from the infected area.    Contact your healthcare provider if:     You have a fever.      Your fever or pain does not go away or gets worse.      The area does not get smaller after 2 days of antibiotics.      Your skin is flaking or peeling off.      You have questions or concerns about your condition or care.    Medicines:     Antibiotics help treat the bacterial infection.       NSAIDs, such as ibuprofen, help decrease swelling, pain, and fever. NSAIDs can cause stomach bleeding or kidney problems in certain people. If you take blood thinner medicine, always ask if NSAIDs are safe for you. Always read the medicine label and follow directions. Do not give these medicines to children under 6 months of age without direction from your child's healthcare provider.      Acetaminophen decreases pain and fever. It is available without a doctor's order. Ask how much to take and how often to take it. Follow directions. Read the labels of all other medicines you are using to see if they also contain acetaminophen, or ask your doctor or pharmacist. Acetaminophen can cause liver damage if not taken correctly. Do not use more than 4 grams (4,000 milligrams) total of acetaminophen in one day.       Take your medicine as directed. Contact your healthcare provider if you think your medicine is not helping or if you have side effects. Tell him or her if you are allergic to any medicine. Keep a list of the medicines, vitamins, and herbs you take. Include the amounts, and when and why you take them. Bring the list or the pill bottles to follow-up visits. Carry your medicine list with you in case of an emergency.    Self-care:     Elevate the area above the level of your heart as often as you can. This will help decrease swelling and pain. Prop the area on pillows or blankets to keep it elevated comfortably.       Clean the area daily until the wound scabs over. Gently wash the area with soap and water. Pat dry. Use dressings as directed.       Place cool or warm, wet cloths on the area as directed. Use clean cloths and clean water. Leave it on the area until the cloth is room temperature. Pat the area dry with a clean, dry cloth. The cloths may help decrease pain.     Prevent cellulitis:     Do not scratch bug bites or areas of injury. You increase your risk for cellulitis by scratching these areas.       Do not share personal items, such as towels, clothing, and razors.       Clean exercise equipment with germ-killing  before and after you use it.      Wash your hands often. Use soap and water. Wash your hands after you use the bathroom, change a child's diapers, or sneeze. Wash your hands before you prepare or eat food. Use lotion to prevent dry, cracked skin. Handwashing           Wear pressure stockings as directed. You may be told to wear the stockings if you have peripheral edema. The stockings improve blood flow and decrease swelling.      Treat athlete’s foot. This can help prevent the spread of a bacterial skin infection.    Follow up with your healthcare provider within 3 days, or as directed: Your healthcare provider will check if your cellulitis is getting better. You may need different medicine. Write down your questions so you remember to ask them during your visits.

## 2023-06-09 NOTE — ED PROVIDER NOTE - PATIENT PORTAL LINK FT
You can access the FollowMyHealth Patient Portal offered by [facility] by registering at the following website: [email or web address]. By joining [employer]’s FollowMyHealth portal, you will also be able to view your health information using other applications (apps) compatible with our system.

## 2023-06-09 NOTE — ED PROVIDER NOTE - MUSCULOSKELETAL, MLM
Right leg with 6cm of erythema to medial aspect of calf with no fluctuance, no crepitus, and no tenderness. 2+ pitting edema of the ankle. Less than 1cm small scab to the left medial shin with 4cm of mild surrounding erythema and warm. No edema on left leg.

## 2023-07-07 NOTE — ED ADULT NURSE NOTE - HAVE YOU HAD A SECOND COVID-19 BOOSTER?
Cc: CAD/stents, severe AS, HFrEF, frequent PVCs    HPI:     Patient is a 79-year-old man, patient of Dr William Bauer, with history of CAD status post 2 stents 2015, HFrEF due to ischemic and valvular cardiomyopathy, severe aortic stenosis (low-flow low gradient), TIA, CKD 3, AAA repair 03/2023, full code. He currently lives at [facility]. Echo 05/2023: LVD 6.1 cm, LVEF 20-25%, mild LVH, grade 3 diastolic dysfunction, moderate AAS, mild to moderate AI, aortic root 4.5 cm, moderate MR, moderate TR, normal RV, moderate bilateral pleural effusions. Dobutamine stress echo 05/2023: LVEF 20% increased to 30-35% on dobutamine drip, severe aortic stenosis (V-max 4.2 cm, mean pressure gradient 34 mmHg on dobutamine drip). [facility] 5/8/23: LAD 40%, oD2 90%, mCx 50%, mRCA 40%, LVEDP 17, abnl AVG    Patient had an admission for acute heart failure in 05/2023 and 06/2023. Patient was seen by Dr. William Bauer on 7/3/23 to further discuss risks and benefits of attempting a TAVR. Patient's advanced age and multiple comorbidities (severe systolic heart failure, severe aortic stenosis, arrhythmias, renal failure) were deemed to be prohibitive to a TAVR procedure. Conservative/medical therapy was elected. His Toprol 12.5 mg daily was stopped due to low BP 88/40 mmHg, HR 45 bpm.    Patient returns to the clinic today for follow-up. His kidney function appears to have significantly deteriorated per review of labs (creatinine 4.6 on 7/6 compared to creatinine of 2.5 on 6/12/2023). Patient reports mild exertional dyspnea unchanged since his discharge from the hospital recently. He reports mild and stable lower extremity edema. His weight has remained relatively stable.        Histories     Past Medical History:   has a past medical history of Arthritis, Ascending aortic aneurysm ([address]), CAD (coronary artery disease), Chronic kidney disease, Constipation, DDD (degenerative disc disease), lumbar, Dental disease, Gout, Hearing loss, Yes

## 2023-08-09 ENCOUNTER — EMERGENCY (EMERGENCY)
Facility: HOSPITAL | Age: 53
LOS: 1 days | Discharge: HOME | End: 2023-08-09
Admitting: EMERGENCY MEDICINE
Payer: MEDICARE

## 2023-08-09 VITALS
SYSTOLIC BLOOD PRESSURE: 138 MMHG | RESPIRATION RATE: 17 BRPM | DIASTOLIC BLOOD PRESSURE: 81 MMHG | HEART RATE: 96 BPM | OXYGEN SATURATION: 99 %

## 2023-08-09 VITALS
RESPIRATION RATE: 18 BRPM | WEIGHT: 149.91 LBS | OXYGEN SATURATION: 96 % | HEART RATE: 112 BPM | DIASTOLIC BLOOD PRESSURE: 82 MMHG | SYSTOLIC BLOOD PRESSURE: 134 MMHG | HEIGHT: 70 IN | TEMPERATURE: 98 F

## 2023-08-09 DIAGNOSIS — R13.10 DYSPHAGIA, UNSPECIFIED: ICD-10-CM

## 2023-08-09 DIAGNOSIS — B20 HUMAN IMMUNODEFICIENCY VIRUS [HIV] DISEASE: ICD-10-CM

## 2023-08-09 DIAGNOSIS — Z86.711 PERSONAL HISTORY OF PULMONARY EMBOLISM: ICD-10-CM

## 2023-08-09 DIAGNOSIS — K12.2 CELLULITIS AND ABSCESS OF MOUTH: ICD-10-CM

## 2023-08-09 LAB
ALBUMIN SERPL ELPH-MCNC: 3.1 G/DL — LOW (ref 3.4–5)
ALBUMIN SERPL ELPH-MCNC: 3.3 G/DL — LOW (ref 3.4–5)
ALP SERPL-CCNC: 104 U/L — SIGNIFICANT CHANGE UP (ref 40–120)
ALP SERPL-CCNC: 108 U/L — SIGNIFICANT CHANGE UP (ref 40–120)
ALT FLD-CCNC: 30 U/L — SIGNIFICANT CHANGE UP (ref 12–42)
ALT FLD-CCNC: 40 U/L — SIGNIFICANT CHANGE UP (ref 12–42)
ANION GAP SERPL CALC-SCNC: 6 MMOL/L — LOW (ref 9–16)
ANION GAP SERPL CALC-SCNC: 8 MMOL/L — LOW (ref 9–16)
APPEARANCE UR: CLEAR — SIGNIFICANT CHANGE UP
AST SERPL-CCNC: 24 U/L — SIGNIFICANT CHANGE UP (ref 15–37)
AST SERPL-CCNC: 46 U/L — HIGH (ref 15–37)
BASOPHILS # BLD AUTO: 0.05 K/UL — SIGNIFICANT CHANGE UP (ref 0–0.2)
BASOPHILS NFR BLD AUTO: 1.2 % — SIGNIFICANT CHANGE UP (ref 0–2)
BILIRUB SERPL-MCNC: 0.6 MG/DL — SIGNIFICANT CHANGE UP (ref 0.2–1.2)
BILIRUB SERPL-MCNC: 0.7 MG/DL — SIGNIFICANT CHANGE UP (ref 0.2–1.2)
BILIRUB UR-MCNC: NEGATIVE — SIGNIFICANT CHANGE UP
BUN SERPL-MCNC: 16 MG/DL — SIGNIFICANT CHANGE UP (ref 7–23)
BUN SERPL-MCNC: 16 MG/DL — SIGNIFICANT CHANGE UP (ref 7–23)
CALCIUM SERPL-MCNC: 8.8 MG/DL — SIGNIFICANT CHANGE UP (ref 8.5–10.5)
CALCIUM SERPL-MCNC: 8.9 MG/DL — SIGNIFICANT CHANGE UP (ref 8.5–10.5)
CHLORIDE SERPL-SCNC: 103 MMOL/L — SIGNIFICANT CHANGE UP (ref 96–108)
CHLORIDE SERPL-SCNC: 104 MMOL/L — SIGNIFICANT CHANGE UP (ref 96–108)
CO2 SERPL-SCNC: 28 MMOL/L — SIGNIFICANT CHANGE UP (ref 22–31)
CO2 SERPL-SCNC: 31 MMOL/L — SIGNIFICANT CHANGE UP (ref 22–31)
COLOR SPEC: YELLOW — SIGNIFICANT CHANGE UP
CREAT SERPL-MCNC: 0.78 MG/DL — SIGNIFICANT CHANGE UP (ref 0.5–1.3)
CREAT SERPL-MCNC: 0.87 MG/DL — SIGNIFICANT CHANGE UP (ref 0.5–1.3)
DIFF PNL FLD: NEGATIVE — SIGNIFICANT CHANGE UP
EGFR: 104 ML/MIN/1.73M2 — SIGNIFICANT CHANGE UP
EGFR: 108 ML/MIN/1.73M2 — SIGNIFICANT CHANGE UP
EOSINOPHIL # BLD AUTO: 0.13 K/UL — SIGNIFICANT CHANGE UP (ref 0–0.5)
EOSINOPHIL NFR BLD AUTO: 3.2 % — SIGNIFICANT CHANGE UP (ref 0–6)
GLUCOSE SERPL-MCNC: 104 MG/DL — HIGH (ref 70–99)
GLUCOSE SERPL-MCNC: 106 MG/DL — HIGH (ref 70–99)
GLUCOSE UR QL: NEGATIVE MG/DL — SIGNIFICANT CHANGE UP
HCT VFR BLD CALC: 38.9 % — LOW (ref 39–50)
HGB BLD-MCNC: 13.1 G/DL — SIGNIFICANT CHANGE UP (ref 13–17)
IMM GRANULOCYTES NFR BLD AUTO: 0.2 % — SIGNIFICANT CHANGE UP (ref 0–0.9)
KETONES UR-MCNC: ABNORMAL MG/DL
LEUKOCYTE ESTERASE UR-ACNC: NEGATIVE — SIGNIFICANT CHANGE UP
LYMPHOCYTES # BLD AUTO: 1.74 K/UL — SIGNIFICANT CHANGE UP (ref 1–3.3)
LYMPHOCYTES # BLD AUTO: 42.2 % — SIGNIFICANT CHANGE UP (ref 13–44)
MCHC RBC-ENTMCNC: 32.3 PG — SIGNIFICANT CHANGE UP (ref 27–34)
MCHC RBC-ENTMCNC: 33.7 GM/DL — SIGNIFICANT CHANGE UP (ref 32–36)
MCV RBC AUTO: 96 FL — SIGNIFICANT CHANGE UP (ref 80–100)
MONOCYTES # BLD AUTO: 0.57 K/UL — SIGNIFICANT CHANGE UP (ref 0–0.9)
MONOCYTES NFR BLD AUTO: 13.8 % — SIGNIFICANT CHANGE UP (ref 2–14)
NEUTROPHILS # BLD AUTO: 1.62 K/UL — LOW (ref 1.8–7.4)
NEUTROPHILS NFR BLD AUTO: 39.4 % — LOW (ref 43–77)
NITRITE UR-MCNC: NEGATIVE — SIGNIFICANT CHANGE UP
NRBC # BLD: 0 /100 WBCS — SIGNIFICANT CHANGE UP (ref 0–0)
PH UR: 6.5 — SIGNIFICANT CHANGE UP (ref 5–8)
PLATELET # BLD AUTO: 243 K/UL — SIGNIFICANT CHANGE UP (ref 150–400)
POTASSIUM SERPL-MCNC: 3.5 MMOL/L — SIGNIFICANT CHANGE UP (ref 3.5–5.3)
POTASSIUM SERPL-MCNC: 4.4 MMOL/L — SIGNIFICANT CHANGE UP (ref 3.5–5.3)
POTASSIUM SERPL-SCNC: 3.5 MMOL/L — SIGNIFICANT CHANGE UP (ref 3.5–5.3)
POTASSIUM SERPL-SCNC: 4.4 MMOL/L — SIGNIFICANT CHANGE UP (ref 3.5–5.3)
PROT SERPL-MCNC: 6.8 G/DL — SIGNIFICANT CHANGE UP (ref 6.4–8.2)
PROT SERPL-MCNC: 7 G/DL — SIGNIFICANT CHANGE UP (ref 6.4–8.2)
PROT UR-MCNC: NEGATIVE MG/DL — SIGNIFICANT CHANGE UP
RBC # BLD: 4.05 M/UL — LOW (ref 4.2–5.8)
RBC # FLD: 12.8 % — SIGNIFICANT CHANGE UP (ref 10.3–14.5)
S PYO AG SPEC QL IA: NEGATIVE — SIGNIFICANT CHANGE UP
SODIUM SERPL-SCNC: 139 MMOL/L — SIGNIFICANT CHANGE UP (ref 132–145)
SODIUM SERPL-SCNC: 141 MMOL/L — SIGNIFICANT CHANGE UP (ref 132–145)
SP GR SPEC: 1.02 — SIGNIFICANT CHANGE UP (ref 1–1.03)
UROBILINOGEN FLD QL: 2 MG/DL (ref 0.2–1)
WBC # BLD: 4.12 K/UL — SIGNIFICANT CHANGE UP (ref 3.8–10.5)
WBC # FLD AUTO: 4.12 K/UL — SIGNIFICANT CHANGE UP (ref 3.8–10.5)

## 2023-08-09 PROCEDURE — 99284 EMERGENCY DEPT VISIT MOD MDM: CPT

## 2023-08-09 RX ORDER — SODIUM CHLORIDE 9 MG/ML
1000 INJECTION INTRAMUSCULAR; INTRAVENOUS; SUBCUTANEOUS ONCE
Refills: 0 | Status: COMPLETED | OUTPATIENT
Start: 2023-08-09 | End: 2023-08-09

## 2023-08-09 RX ORDER — KETOROLAC TROMETHAMINE 30 MG/ML
15 SYRINGE (ML) INJECTION ONCE
Refills: 0 | Status: DISCONTINUED | OUTPATIENT
Start: 2023-08-09 | End: 2023-08-09

## 2023-08-09 RX ORDER — DEXAMETHASONE 0.5 MG/5ML
6 ELIXIR ORAL ONCE
Refills: 0 | Status: COMPLETED | OUTPATIENT
Start: 2023-08-09 | End: 2023-08-09

## 2023-08-09 RX ORDER — AZITHROMYCIN 500 MG/1
1 TABLET, FILM COATED ORAL
Qty: 3 | Refills: 0
Start: 2023-08-09 | End: 2023-08-11

## 2023-08-09 RX ADMIN — Medication 6 MILLIGRAM(S): at 13:29

## 2023-08-09 RX ADMIN — Medication 15 MILLIGRAM(S): at 13:29

## 2023-08-09 NOTE — ED ADULT NURSE NOTE - OBJECTIVE STATEMENT
Speaking coherent but muffled. Feels like something stuck in throat. Able to handle secretions, on o2 monitor 96% RA.

## 2023-08-09 NOTE — ED ADULT TRIAGE NOTE - CHIEF COMPLAINT QUOTE
Pt walks in c/o throat pain with difficulty swallowing/muffled voice since this morning. Pt denies difficulty breathing but states it feels like there is a lump in his throat when he swallows. No oral secretions, SpO2 97% on RA.

## 2023-08-09 NOTE — ED PROVIDER NOTE - PHYSICAL EXAMINATION
VITAL SIGNS: I have reviewed nursing notes and confirm.  CONST: No apparent distress.  ENT: +Posterior pharynx bilateral pharyngeal edema and erythema, kissing tonsils, no exudates, able to tolerate secretions. No trismus, no stridor. No nasal discharge; airway clear. TM's clear.   EYES: Sclera clear. Pupils round and symmetrical bilaterally.  RESP: Breathing comfortably; speaking in full sentences.   ABD: Soft; non tender; +suprapubic tenderness. No CVA tenderness. Voluntary guarding, no rebound.   MSK: No acute deformities noted to extremities. No tenderness to cervical/thoracic/lumbar spine to palpation.  NEURO: Alert, oriented. Speech is fluent and appropriate. Moving all extremities appropriately. No gross motor or sensory abnormalities.  SKIN: Skin is normal temperature; no diaphoresis; no pallor.  PSYCH: Cooperative. Appropriate mood, language, and behavior. VITAL SIGNS: I have reviewed nursing notes and confirm.  CONST: No apparent distress.  ENT: +Mild posterior pharynx bilateral pharyngeal edema and erythema, +edematous uvula, kissing tonsils, no exudates, able to tolerate secretions. No trismus, no stridor. No nasal discharge; airway clear. TM's clear.   EYES: Sclera clear. Pupils round and symmetrical bilaterally.  RESP: Breathing comfortably; speaking in full sentences.   ABD: Soft; non tender; +suprapubic tenderness. No CVA tenderness. Voluntary guarding, no rebound.   MSK: No acute deformities noted to extremities. No tenderness to cervical/thoracic/lumbar spine to palpation.  NEURO: Alert, oriented. Speech is fluent and appropriate. Moving all extremities appropriately. No gross motor or sensory abnormalities.  SKIN: Skin is normal temperature; no diaphoresis; no pallor.  PSYCH: Cooperative. Appropriate mood, language, and behavior.

## 2023-08-09 NOTE — ED PROVIDER NOTE - CLINICAL SUMMARY MEDICAL DECISION MAKING FREE TEXT BOX
Patient with Hx of HIV and renal stones presents with throat pain with difficulty swallowing but able to tolerate secretions. No trismus. Also complaining of flank pain that resolved. Will check UA, labs, give IV decadron and Toradol, do swabs, and continue to monitor.

## 2023-08-09 NOTE — ED PROVIDER NOTE - PATIENT PORTAL LINK FT
You can access the FollowMyHealth Patient Portal offered by [facility] by registering at the following website: [email or web address]. By joining [employer]’s FollowMyHealth portal, you will also be able to view your health information using other applications (apps) compatible with our system.

## 2023-08-09 NOTE — ED PROVIDER NOTE - OBJECTIVE STATEMENT
Patient with Hx of HIV on Genvoya, viral load undetectable, T cells over 600, Hx of renal calculi, presents with throat pain with difficulty swallowing. He states the throat pain began last night and this morning he felt a lump in his throat. Denies fever, chills, sweats. In addition yesterday afternoon he had left flank pain similar to his kidney stones in the past. He took 3 Advil and 2 Tylenol yesterday with resolution of flank pain. Denies dysuria or hematuria. Admits to mild suprapubic tenderness.

## 2023-08-10 LAB
CULTURE RESULTS: SIGNIFICANT CHANGE UP
SPECIMEN SOURCE: SIGNIFICANT CHANGE UP

## 2023-08-11 LAB
CULTURE RESULTS: SIGNIFICANT CHANGE UP
SPECIMEN SOURCE: SIGNIFICANT CHANGE UP

## 2023-08-21 ENCOUNTER — EMERGENCY (EMERGENCY)
Facility: HOSPITAL | Age: 53
LOS: 1 days | Discharge: HOME | End: 2023-08-21
Attending: EMERGENCY MEDICINE | Admitting: EMERGENCY MEDICINE
Payer: MEDICARE

## 2023-08-21 VITALS
OXYGEN SATURATION: 97 % | HEART RATE: 90 BPM | DIASTOLIC BLOOD PRESSURE: 80 MMHG | SYSTOLIC BLOOD PRESSURE: 155 MMHG | RESPIRATION RATE: 16 BRPM

## 2023-08-21 VITALS
HEART RATE: 118 BPM | HEIGHT: 67 IN | OXYGEN SATURATION: 96 % | WEIGHT: 149.91 LBS | TEMPERATURE: 98 F | RESPIRATION RATE: 16 BRPM | DIASTOLIC BLOOD PRESSURE: 106 MMHG | SYSTOLIC BLOOD PRESSURE: 162 MMHG

## 2023-08-21 LAB
ALBUMIN SERPL ELPH-MCNC: 3.5 G/DL — SIGNIFICANT CHANGE UP (ref 3.4–5)
ALP SERPL-CCNC: 116 U/L — SIGNIFICANT CHANGE UP (ref 40–120)
ALT FLD-CCNC: 37 U/L — SIGNIFICANT CHANGE UP (ref 12–42)
ANION GAP SERPL CALC-SCNC: 6 MMOL/L — LOW (ref 9–16)
APPEARANCE UR: CLEAR — SIGNIFICANT CHANGE UP
AST SERPL-CCNC: 15 U/L — SIGNIFICANT CHANGE UP (ref 15–37)
BACTERIA # UR AUTO: PRESENT /HPF — SIGNIFICANT CHANGE UP
BASOPHILS # BLD AUTO: 0.03 K/UL — SIGNIFICANT CHANGE UP (ref 0–0.2)
BASOPHILS NFR BLD AUTO: 0.6 % — SIGNIFICANT CHANGE UP (ref 0–2)
BILIRUB SERPL-MCNC: 0.3 MG/DL — SIGNIFICANT CHANGE UP (ref 0.2–1.2)
BILIRUB UR-MCNC: NEGATIVE — SIGNIFICANT CHANGE UP
BUN SERPL-MCNC: 13 MG/DL — SIGNIFICANT CHANGE UP (ref 7–23)
CALCIUM SERPL-MCNC: 9.1 MG/DL — SIGNIFICANT CHANGE UP (ref 8.5–10.5)
CHLORIDE SERPL-SCNC: 105 MMOL/L — SIGNIFICANT CHANGE UP (ref 96–108)
CO2 SERPL-SCNC: 32 MMOL/L — HIGH (ref 22–31)
COLOR SPEC: YELLOW — SIGNIFICANT CHANGE UP
COMMENT - URINE: SIGNIFICANT CHANGE UP
CREAT SERPL-MCNC: 0.86 MG/DL — SIGNIFICANT CHANGE UP (ref 0.5–1.3)
DIFF PNL FLD: ABNORMAL
EGFR: 105 ML/MIN/1.73M2 — SIGNIFICANT CHANGE UP
EOSINOPHIL # BLD AUTO: 0.07 K/UL — SIGNIFICANT CHANGE UP (ref 0–0.5)
EOSINOPHIL NFR BLD AUTO: 1.3 % — SIGNIFICANT CHANGE UP (ref 0–6)
GLUCOSE SERPL-MCNC: 102 MG/DL — HIGH (ref 70–99)
GLUCOSE UR QL: NEGATIVE MG/DL — SIGNIFICANT CHANGE UP
HCT VFR BLD CALC: 39.3 % — SIGNIFICANT CHANGE UP (ref 39–50)
HGB BLD-MCNC: 12.9 G/DL — LOW (ref 13–17)
IMM GRANULOCYTES NFR BLD AUTO: 0.4 % — SIGNIFICANT CHANGE UP (ref 0–0.9)
KETONES UR-MCNC: NEGATIVE MG/DL — SIGNIFICANT CHANGE UP
LACTATE BLDV-MCNC: 0.6 MMOL/L — SIGNIFICANT CHANGE UP (ref 0.5–2)
LEUKOCYTE ESTERASE UR-ACNC: ABNORMAL
LIDOCAIN IGE QN: 82 U/L — SIGNIFICANT CHANGE UP (ref 73–393)
LYMPHOCYTES # BLD AUTO: 1.64 K/UL — SIGNIFICANT CHANGE UP (ref 1–3.3)
LYMPHOCYTES # BLD AUTO: 30.4 % — SIGNIFICANT CHANGE UP (ref 13–44)
MCHC RBC-ENTMCNC: 31.9 PG — SIGNIFICANT CHANGE UP (ref 27–34)
MCHC RBC-ENTMCNC: 32.8 GM/DL — SIGNIFICANT CHANGE UP (ref 32–36)
MCV RBC AUTO: 97 FL — SIGNIFICANT CHANGE UP (ref 80–100)
MONOCYTES # BLD AUTO: 0.54 K/UL — SIGNIFICANT CHANGE UP (ref 0–0.9)
MONOCYTES NFR BLD AUTO: 10 % — SIGNIFICANT CHANGE UP (ref 2–14)
NEUTROPHILS # BLD AUTO: 3.09 K/UL — SIGNIFICANT CHANGE UP (ref 1.8–7.4)
NEUTROPHILS NFR BLD AUTO: 57.3 % — SIGNIFICANT CHANGE UP (ref 43–77)
NITRITE UR-MCNC: NEGATIVE — SIGNIFICANT CHANGE UP
NRBC # BLD: 0 /100 WBCS — SIGNIFICANT CHANGE UP (ref 0–0)
PH UR: >=9 (ref 5–8)
PLATELET # BLD AUTO: 225 K/UL — SIGNIFICANT CHANGE UP (ref 150–400)
POTASSIUM SERPL-MCNC: 3.6 MMOL/L — SIGNIFICANT CHANGE UP (ref 3.5–5.3)
POTASSIUM SERPL-SCNC: 3.6 MMOL/L — SIGNIFICANT CHANGE UP (ref 3.5–5.3)
PROT SERPL-MCNC: 7 G/DL — SIGNIFICANT CHANGE UP (ref 6.4–8.2)
PROT UR-MCNC: ABNORMAL MG/DL
RBC # BLD: 4.05 M/UL — LOW (ref 4.2–5.8)
RBC # FLD: 12.5 % — SIGNIFICANT CHANGE UP (ref 10.3–14.5)
RBC CASTS # UR COMP ASSIST: 21 /HPF — HIGH (ref 0–4)
SODIUM SERPL-SCNC: 143 MMOL/L — SIGNIFICANT CHANGE UP (ref 132–145)
SP GR SPEC: 1.02 — SIGNIFICANT CHANGE UP (ref 1–1.03)
TROPONIN I, HIGH SENSITIVITY RESULT: 5 NG/L — SIGNIFICANT CHANGE UP
TROPONIN I, HIGH SENSITIVITY RESULT: 5.5 NG/L — SIGNIFICANT CHANGE UP
UROBILINOGEN FLD QL: 1 MG/DL — SIGNIFICANT CHANGE UP (ref 0.2–1)
WBC # BLD: 5.39 K/UL — SIGNIFICANT CHANGE UP (ref 3.8–10.5)
WBC # FLD AUTO: 5.39 K/UL — SIGNIFICANT CHANGE UP (ref 3.8–10.5)
WBC UR QL: 2 /HPF — SIGNIFICANT CHANGE UP (ref 0–5)

## 2023-08-21 PROCEDURE — 99285 EMERGENCY DEPT VISIT HI MDM: CPT

## 2023-08-21 PROCEDURE — 74177 CT ABD & PELVIS W/CONTRAST: CPT | Mod: 26

## 2023-08-21 PROCEDURE — 99053 MED SERV 10PM-8AM 24 HR FAC: CPT

## 2023-08-21 RX ORDER — TAMSULOSIN HYDROCHLORIDE 0.4 MG/1
0.4 CAPSULE ORAL ONCE
Refills: 0 | Status: COMPLETED | OUTPATIENT
Start: 2023-08-21 | End: 2023-08-21

## 2023-08-21 RX ORDER — ONDANSETRON 8 MG/1
4 TABLET, FILM COATED ORAL ONCE
Refills: 0 | Status: COMPLETED | OUTPATIENT
Start: 2023-08-21 | End: 2023-08-21

## 2023-08-21 RX ORDER — TAMSULOSIN HYDROCHLORIDE 0.4 MG/1
1 CAPSULE ORAL
Qty: 7 | Refills: 0
Start: 2023-08-21 | End: 2023-08-27

## 2023-08-21 RX ORDER — SODIUM CHLORIDE 9 MG/ML
1000 INJECTION INTRAMUSCULAR; INTRAVENOUS; SUBCUTANEOUS ONCE
Refills: 0 | Status: COMPLETED | OUTPATIENT
Start: 2023-08-21 | End: 2023-08-21

## 2023-08-21 RX ORDER — FAMOTIDINE 10 MG/ML
1 INJECTION INTRAVENOUS
Qty: 0 | Refills: 0 | DISCHARGE

## 2023-08-21 RX ORDER — ONDANSETRON 8 MG/1
1 TABLET, FILM COATED ORAL
Qty: 10 | Refills: 0
Start: 2023-08-21

## 2023-08-21 RX ORDER — OXYCODONE AND ACETAMINOPHEN 5; 325 MG/1; MG/1
1 TABLET ORAL
Qty: 12 | Refills: 0
Start: 2023-08-21

## 2023-08-21 RX ORDER — CEFTRIAXONE 500 MG/1
1000 INJECTION, POWDER, FOR SOLUTION INTRAMUSCULAR; INTRAVENOUS ONCE
Refills: 0 | Status: COMPLETED | OUTPATIENT
Start: 2023-08-21 | End: 2023-08-21

## 2023-08-21 RX ORDER — MORPHINE SULFATE 50 MG/1
4 CAPSULE, EXTENDED RELEASE ORAL ONCE
Refills: 0 | Status: DISCONTINUED | OUTPATIENT
Start: 2023-08-21 | End: 2023-08-21

## 2023-08-21 RX ORDER — CEFPODOXIME PROXETIL 100 MG
1 TABLET ORAL
Qty: 20 | Refills: 0
Start: 2023-08-21 | End: 2023-08-30

## 2023-08-21 RX ORDER — IOHEXOL 300 MG/ML
30 INJECTION, SOLUTION INTRAVENOUS ONCE
Refills: 0 | Status: COMPLETED | OUTPATIENT
Start: 2023-08-21 | End: 2023-08-21

## 2023-08-21 RX ADMIN — MORPHINE SULFATE 4 MILLIGRAM(S): 50 CAPSULE, EXTENDED RELEASE ORAL at 03:05

## 2023-08-21 RX ADMIN — ONDANSETRON 4 MILLIGRAM(S): 8 TABLET, FILM COATED ORAL at 03:37

## 2023-08-21 RX ADMIN — ONDANSETRON 4 MILLIGRAM(S): 8 TABLET, FILM COATED ORAL at 06:40

## 2023-08-21 RX ADMIN — CEFTRIAXONE 100 MILLIGRAM(S): 500 INJECTION, POWDER, FOR SOLUTION INTRAMUSCULAR; INTRAVENOUS at 06:40

## 2023-08-21 RX ADMIN — ONDANSETRON 4 MILLIGRAM(S): 8 TABLET, FILM COATED ORAL at 09:06

## 2023-08-21 RX ADMIN — SODIUM CHLORIDE 1000 MILLILITER(S): 9 INJECTION INTRAMUSCULAR; INTRAVENOUS; SUBCUTANEOUS at 03:38

## 2023-08-21 RX ADMIN — TAMSULOSIN HYDROCHLORIDE 0.4 MILLIGRAM(S): 0.4 CAPSULE ORAL at 07:19

## 2023-08-21 RX ADMIN — IOHEXOL 30 MILLILITER(S): 300 INJECTION, SOLUTION INTRAVENOUS at 03:38

## 2023-08-21 RX ADMIN — SODIUM CHLORIDE 1000 MILLILITER(S): 9 INJECTION INTRAMUSCULAR; INTRAVENOUS; SUBCUTANEOUS at 07:19

## 2023-08-21 NOTE — ED ADULT TRIAGE NOTE - CHIEF COMPLAINT QUOTE
male patient walk in to ED for eval of abdominal pain w/ nausea and vomiting and chest tightness starting 2 hrs prior to arrival. patient endorses t year prior intestinal blockage requiring gastric suctioning. patient endorses hx of HIV that is well managed and recently recovering from being covid (+). patient is alert verbal oriented x3 able to make needs known. denies hematemesis

## 2023-08-21 NOTE — ED PROVIDER NOTE - CLINICAL SUMMARY MEDICAL DECISION MAKING FREE TEXT BOX
Patient with history of HIV (CD4 >500, VL undetectable), history of SBO, presents with 3 hours of diffuse abdominal pain with nausea, vomiting, frequent belching, not passing gas.  On exam, patient is afebrile, vital signs are stable.  Patient is uncomfortable appearing with diffuse abdominal tenderness to palpation.  Differential includes SBO, atypical ACS, other.  Plan for labs, analgesia, CT abdomen/pelvis with p.o. and IV contrast, reassess.

## 2023-08-21 NOTE — ED PROVIDER NOTE - PATIENT PORTAL LINK FT
You can access the FollowMyHealth Patient Portal offered by [facility] by registering at the following website: [email or web address]. By joining [employer]’s FollowMyHealth portal, you will also be able to view your health information using other applications (apps) compatible with our system.

## 2023-08-21 NOTE — ED PROVIDER NOTE - PHYSICAL EXAMINATION
Constitutional: awake and alert, uncomfortable appearing  HEENT: head normocephalic and atraumatic. moist mucous membranes  Eyes: extraocular movements intact, normal conjunctiva  Neck: supple, normal ROM  Cardiovascular: regular rate   Pulmonary: no respiratory distress  Gastrointestinal: abdomen flat and nondistended, diffuse TTP  Skin: warm, dry, normal for ethnicity  Musculoskeletal: no edema, no deformity  Neurological: oriented x4, no focal neurologic deficit.   Psychiatric: anxious appearing

## 2023-08-21 NOTE — ED PROVIDER NOTE - OBJECTIVE STATEMENT
53-year-old male with history of HIV (CD4 over 500, viral load undetectable), history of SBO not requiring surgery, but requiring NG tube, presents with diffuse abdominal pain, nausea, vomiting for 3 hours, with frequent burping.  Had small bowel movement earlier today prior to onset of pain.  Not passing gas since onset of pain.  Feels like symptoms of prior SBO.  No fevers or chills. 53-year-old male with history of HIV (CD4 over 500, viral load undetectable), history of SBO not requiring surgery, but requiring NG tube, hx of kidney stones, presents with diffuse abdominal pain, nausea, vomiting for 3 hours, with frequent burping.  Had small bowel movement earlier today prior to onset of pain.  Not passing gas since onset of pain.  Feels like symptoms of prior SBO.  No fevers or chills.

## 2023-08-21 NOTE — ED PROVIDER NOTE - IV ALTEPLASE EXCL ABS HIDDEN
Patient called in requesting a 90 day supply of 40 tablets per month #[facility]. Please advise if this is correct and I can pend, thank you.
Which med?  thanks
show

## 2023-08-21 NOTE — ED PROVIDER NOTE - PROGRESS NOTE DETAILS
CT shows L distal ureteral stone measuring 1.0cm on prelim read with hydroureter.  UA with +blood.  CT also shows thickening of bladder wall; pt given IV ceftriaxone.  On review of imaging from [facility], pt most recently had CT a/p in 06/2023 which showed 6mm distal ureteral stone without hydroureter.  More recent kidney US showed no hydroureter.    Current stone either new stone or another stone with old stone.  Will consult urology. Pt feels much better.  Resting comfortably.  CT shows L distal ureteral stone measuring 1.0cm on prelim read with hydroureter.  UA with +blood.  CT also shows thickening of bladder wall; pt given IV ceftriaxone.  On review of imaging from [facility], pt most recently had CT a/p in 06/2023 which showed 6mm distal ureteral stone without hydroureter.  More recent kidney US showed no hydroureter.    Current stone either new stone or progression of prior stone.  Will consult urology. Discussed w Dr. Chrales from urology; if pain is well controlled, OK to dc w plan for outpatient follow up w urology.    Final CT read pending.  Will reassess symptoms after final CT read. ROLLY: Patient was signed out to me by Dr. Samuels at 0800 pending final CT result. Final read shows 1cm stone but no hydronephrosis. No bladder wall thickening, so no need to continue abx. Patient has no pain at this time but feels nauseous. Offered admission. Patient declined, has appt with his urologist next week. Return to the ED immediately if getting worse, not improving, or if having any new or troubling symptoms.

## 2023-08-21 NOTE — ED PROVIDER NOTE - NSICDXPASTMEDICALHX_GEN_ALL_CORE_FT
PAST MEDICAL HISTORY:  HIV (human immunodeficiency virus infection)     Pulmonary embolism     SBO (small bowel obstruction)

## 2023-08-21 NOTE — ED PROVIDER NOTE - CARE PROVIDER_API CALL
Brit Charles  Urology  [address] [place] [id number]  Phone: [phone]  Fax: [phone]  Follow Up Time:

## 2023-08-22 LAB
CULTURE RESULTS: SIGNIFICANT CHANGE UP
SPECIMEN SOURCE: SIGNIFICANT CHANGE UP

## 2023-08-23 DIAGNOSIS — Z87.442 PERSONAL HISTORY OF URINARY CALCULI: ICD-10-CM

## 2023-08-23 DIAGNOSIS — Z87.19 PERSONAL HISTORY OF OTHER DISEASES OF THE DIGESTIVE SYSTEM: ICD-10-CM

## 2023-08-23 DIAGNOSIS — Z21 ASYMPTOMATIC HUMAN IMMUNODEFICIENCY VIRUS [HIV] INFECTION STATUS: ICD-10-CM

## 2023-08-23 DIAGNOSIS — Z86.711 PERSONAL HISTORY OF PULMONARY EMBOLISM: ICD-10-CM

## 2023-08-23 DIAGNOSIS — R10.84 GENERALIZED ABDOMINAL PAIN: ICD-10-CM

## 2023-08-23 DIAGNOSIS — N20.1 CALCULUS OF URETER: ICD-10-CM

## 2023-10-27 NOTE — ED ADULT NURSE NOTE - CHPI ED NUR SYMPTOMS POS
Calcipotriene Counseling:  I discussed with the patient the risks of calcipotriene including but not limited to erythema, scaling, itching, and irritation. body aches diarrhea/SHORTNESS OF BREATH

## 2023-11-03 NOTE — ED ADULT NURSE NOTE - LATERALITY
PRE-OP DATA and Check List - GI:  Procedure: Colonoscopy (42131) with SuPrep  Diagnosis: Special Screening For Malignant Neoplasms  Tentative Date: Routine (next available or patient preference)  Tentative Time: To be determined  Duration of Procedure: 30 min  Anesthesia: MAC  Pre-Op Needed: no    Do not take any herbal medications/ Vitamins/ Fish Oil for 7 days prior to your procedure unless otherwise directed by your doctor.    Patient should hold NSAID such as ibuprofen, aleve, advil, meloxicam, naproxen, aspirin, etc for 3 days prior to procedure. It is okay to use tylenol during this time.    
Patient is scheduled for a/an Colonoscopy ([id number]) with SuPrep, under MAC, with Zhanna Loza MD on 02/22/2024 at [facility].      Prep instructions were sent to patient via my chart message.    Nurse:   Please send the procedure prep of Suprep and Zofran/Ondansetron (anti-nausea medication) to the patient's pharmacy and contact patient for any pre-procedure concerns or questions.        [id number] - [place] [address] [address] [Patient Preferred]  [phone]          
right

## 2024-02-28 NOTE — ED ADULT NURSE NOTE - TEMPLATE LIST FOR HEAD TO TOE ASSESSMENT
Submitted PA for ENTRESTO  Via CMM (Key: [id number] STATUS: PENDING.    Follow up done daily; if no decision with in three days we will refax.  If another three days goes by with no decision will call the insurance for status.     Abdominal Pain, N/V/D

## 2024-04-12 NOTE — ED ADULT NURSE NOTE - NSFALLRSKPASTHIST_ED_ALL_ED
ALLERGIES:  No Known Allergies  Medications reviewed with patient.  Tobacco use verified.  Primary medical doctor: Kathy Watson DO    Chief Complaint   Patient presents with    Office Visit    Finger     New patient , right ring finger       no

## 2024-05-14 ENCOUNTER — EMERGENCY (EMERGENCY)
Facility: HOSPITAL | Age: 54
LOS: 1 days | Discharge: HOME | End: 2024-05-14
Attending: EMERGENCY MEDICINE | Admitting: EMERGENCY MEDICINE
Payer: MEDICARE

## 2024-05-14 VITALS
SYSTOLIC BLOOD PRESSURE: 136 MMHG | HEART RATE: 115 BPM | HEIGHT: 70 IN | TEMPERATURE: 99 F | WEIGHT: 154.1 LBS | OXYGEN SATURATION: 97 % | RESPIRATION RATE: 18 BRPM | DIASTOLIC BLOOD PRESSURE: 90 MMHG

## 2024-05-14 DIAGNOSIS — Z20.822 CONTACT WITH AND (SUSPECTED) EXPOSURE TO COVID-19: ICD-10-CM

## 2024-05-14 DIAGNOSIS — R05.9 COUGH, UNSPECIFIED: ICD-10-CM

## 2024-05-14 DIAGNOSIS — J18.9 PNEUMONIA, UNSPECIFIED ORGANISM: ICD-10-CM

## 2024-05-14 DIAGNOSIS — F17.290 NICOTINE DEPENDENCE, OTHER TOBACCO PRODUCT, UNCOMPLICATED: ICD-10-CM

## 2024-05-14 DIAGNOSIS — Z21 ASYMPTOMATIC HUMAN IMMUNODEFICIENCY VIRUS [HIV] INFECTION STATUS: ICD-10-CM

## 2024-05-14 PROCEDURE — 99053 MED SERV 10PM-8AM 24 HR FAC: CPT

## 2024-05-14 PROCEDURE — 99285 EMERGENCY DEPT VISIT HI MDM: CPT

## 2024-05-14 NOTE — ED ADULT TRIAGE NOTE - CHIEF COMPLAINT QUOTE
Walk in pt with complaints of generalized body aches and pain, headache, chills, and cough with yellow phlegm.

## 2024-05-15 VITALS
SYSTOLIC BLOOD PRESSURE: 123 MMHG | RESPIRATION RATE: 19 BRPM | HEART RATE: 88 BPM | TEMPERATURE: 98 F | DIASTOLIC BLOOD PRESSURE: 84 MMHG | OXYGEN SATURATION: 96 %

## 2024-05-15 PROBLEM — K56.609 UNSPECIFIED INTESTINAL OBSTRUCTION, UNSPECIFIED AS TO PARTIAL VERSUS COMPLETE OBSTRUCTION: Chronic | Status: ACTIVE | Noted: 2023-08-21

## 2024-05-15 LAB
ALBUMIN SERPL ELPH-MCNC: 3.2 G/DL — LOW (ref 3.4–5)
ALP SERPL-CCNC: 123 U/L — HIGH (ref 40–120)
ALT FLD-CCNC: SIGNIFICANT CHANGE UP U/L (ref 12–42)
ANION GAP SERPL CALC-SCNC: 11 MMOL/L — SIGNIFICANT CHANGE UP (ref 9–16)
AST SERPL-CCNC: SIGNIFICANT CHANGE UP U/L (ref 15–37)
BASOPHILS # BLD AUTO: 0.05 K/UL — SIGNIFICANT CHANGE UP (ref 0–0.2)
BASOPHILS NFR BLD AUTO: 0.6 % — SIGNIFICANT CHANGE UP (ref 0–2)
BILIRUB SERPL-MCNC: 0.6 MG/DL — SIGNIFICANT CHANGE UP (ref 0.2–1.2)
BUN SERPL-MCNC: 17 MG/DL — SIGNIFICANT CHANGE UP (ref 7–23)
CALCIUM SERPL-MCNC: 8.8 MG/DL — SIGNIFICANT CHANGE UP (ref 8.5–10.5)
CHLORIDE SERPL-SCNC: 105 MMOL/L — SIGNIFICANT CHANGE UP (ref 96–108)
CO2 SERPL-SCNC: 24 MMOL/L — SIGNIFICANT CHANGE UP (ref 22–31)
CREAT SERPL-MCNC: 0.72 MG/DL — SIGNIFICANT CHANGE UP (ref 0.5–1.3)
EGFR: 110 ML/MIN/1.73M2 — SIGNIFICANT CHANGE UP
EOSINOPHIL # BLD AUTO: 0.16 K/UL — SIGNIFICANT CHANGE UP (ref 0–0.5)
EOSINOPHIL NFR BLD AUTO: 2 % — SIGNIFICANT CHANGE UP (ref 0–6)
FLUAV AG NPH QL: SIGNIFICANT CHANGE UP
FLUBV AG NPH QL: SIGNIFICANT CHANGE UP
GLUCOSE SERPL-MCNC: 98 MG/DL — SIGNIFICANT CHANGE UP (ref 70–99)
HCT VFR BLD CALC: 43.3 % — SIGNIFICANT CHANGE UP (ref 39–50)
HGB BLD-MCNC: 14.9 G/DL — SIGNIFICANT CHANGE UP (ref 13–17)
IMM GRANULOCYTES NFR BLD AUTO: 0.1 % — SIGNIFICANT CHANGE UP (ref 0–0.9)
LACTATE BLDV-MCNC: 1.1 MMOL/L — SIGNIFICANT CHANGE UP (ref 0.5–2)
LYMPHOCYTES # BLD AUTO: 2.29 K/UL — SIGNIFICANT CHANGE UP (ref 1–3.3)
LYMPHOCYTES # BLD AUTO: 29.2 % — SIGNIFICANT CHANGE UP (ref 13–44)
MANUAL SMEAR VERIFICATION: SIGNIFICANT CHANGE UP
MCHC RBC-ENTMCNC: 33.3 PG — SIGNIFICANT CHANGE UP (ref 27–34)
MCHC RBC-ENTMCNC: 34.4 GM/DL — SIGNIFICANT CHANGE UP (ref 32–36)
MCV RBC AUTO: 96.7 FL — SIGNIFICANT CHANGE UP (ref 80–100)
MICROCYTES BLD QL: SLIGHT — SIGNIFICANT CHANGE UP
MONOCYTES # BLD AUTO: 0.95 K/UL — HIGH (ref 0–0.9)
MONOCYTES NFR BLD AUTO: 12.1 % — SIGNIFICANT CHANGE UP (ref 2–14)
NEUTROPHILS # BLD AUTO: 4.39 K/UL — SIGNIFICANT CHANGE UP (ref 1.8–7.4)
NEUTROPHILS NFR BLD AUTO: 56 % — SIGNIFICANT CHANGE UP (ref 43–77)
NRBC # BLD: 0 /100 WBCS — SIGNIFICANT CHANGE UP (ref 0–0)
PCO2 BLDV: 39 MMHG — LOW (ref 42–55)
PH BLDV: 7.44 — HIGH (ref 7.32–7.43)
PLAT MORPH BLD: NORMAL — SIGNIFICANT CHANGE UP
PLATELET # BLD AUTO: 235 K/UL — SIGNIFICANT CHANGE UP (ref 150–400)
PO2 BLDV: 52 MMHG — HIGH (ref 25–45)
POTASSIUM SERPL-MCNC: 4.9 MMOL/L — SIGNIFICANT CHANGE UP (ref 3.5–5.3)
POTASSIUM SERPL-SCNC: 4.9 MMOL/L — SIGNIFICANT CHANGE UP (ref 3.5–5.3)
PROT SERPL-MCNC: 7.3 G/DL — SIGNIFICANT CHANGE UP (ref 6.4–8.2)
RBC # BLD: 4.48 M/UL — SIGNIFICANT CHANGE UP (ref 4.2–5.8)
RBC # FLD: 12.6 % — SIGNIFICANT CHANGE UP (ref 10.3–14.5)
RBC BLD AUTO: ABNORMAL
RSV RNA NPH QL NAA+NON-PROBE: SIGNIFICANT CHANGE UP
SAO2 % BLDV: 85.1 % — SIGNIFICANT CHANGE UP (ref 67–88)
SARS-COV-2 RNA SPEC QL NAA+PROBE: SIGNIFICANT CHANGE UP
SODIUM SERPL-SCNC: 140 MMOL/L — SIGNIFICANT CHANGE UP (ref 132–145)
WBC # BLD: 7.85 K/UL — SIGNIFICANT CHANGE UP (ref 3.8–10.5)
WBC # FLD AUTO: 7.85 K/UL — SIGNIFICANT CHANGE UP (ref 3.8–10.5)

## 2024-05-15 PROCEDURE — 71046 X-RAY EXAM CHEST 2 VIEWS: CPT | Mod: 26

## 2024-05-15 RX ORDER — KETOROLAC TROMETHAMINE 30 MG/ML
15 SYRINGE (ML) INJECTION ONCE
Refills: 0 | Status: DISCONTINUED | OUTPATIENT
Start: 2024-05-15 | End: 2024-05-15

## 2024-05-15 RX ORDER — ACETAMINOPHEN 500 MG
975 TABLET ORAL ONCE
Refills: 0 | Status: COMPLETED | OUTPATIENT
Start: 2024-05-15 | End: 2024-05-15

## 2024-05-15 RX ORDER — AZITHROMYCIN 500 MG/1
1 TABLET, FILM COATED ORAL
Qty: 4 | Refills: 0
Start: 2024-05-15 | End: 2024-05-18

## 2024-05-15 RX ORDER — DEXAMETHASONE 0.5 MG/5ML
10 ELIXIR ORAL ONCE
Refills: 0 | Status: COMPLETED | OUTPATIENT
Start: 2024-05-15 | End: 2024-05-15

## 2024-05-15 RX ORDER — SODIUM CHLORIDE 9 MG/ML
1000 INJECTION INTRAMUSCULAR; INTRAVENOUS; SUBCUTANEOUS ONCE
Refills: 0 | Status: DISCONTINUED | OUTPATIENT
Start: 2024-05-15 | End: 2024-05-15

## 2024-05-15 RX ORDER — AZITHROMYCIN 500 MG/1
500 TABLET, FILM COATED ORAL ONCE
Refills: 0 | Status: COMPLETED | OUTPATIENT
Start: 2024-05-15 | End: 2024-05-15

## 2024-05-15 RX ADMIN — Medication 15 MILLIGRAM(S): at 02:09

## 2024-05-15 RX ADMIN — Medication 975 MILLIGRAM(S): at 01:19

## 2024-05-15 RX ADMIN — Medication 10 MILLIGRAM(S): at 02:54

## 2024-05-15 RX ADMIN — AZITHROMYCIN 500 MILLIGRAM(S): 500 TABLET, FILM COATED ORAL at 02:54

## 2024-05-15 RX ADMIN — Medication 200 MILLIGRAM(S): at 01:19

## 2024-05-15 NOTE — ED ADULT NURSE NOTE - OBJECTIVE STATEMENT
pt presents to ed for cough with yellow mucous, sinus congestion, headache, and nausea x 10 days, pmh of hiv, compliant with meds. denies fever

## 2024-05-15 NOTE — ED PROVIDER NOTE - CLINICAL SUMMARY MEDICAL DECISION MAKING FREE TEXT BOX
53 yo M Hx HIV, on gemvoya, smoker - vapes, presents with cough, congestion, runny nose, chest tightness, mild sob, and headache and bodyaches since may 5, with minimal improvement. denies weight loss, night sweats. Using home mucinex with minimal relief. denies syncope, palpitations, sick contacts or recent travel. tried to see pmd, and got an appt for may 23 felt worse tonight. Denies drugs or etoh.    Plan to check viral swab to rule out viral etiology, chest x-ray rule out pneumonia, CBC and basic labs.  Patient is otherwise nontoxic-appearing he is not hypoxic or tachycardic.  Will treat with antitussives, patient tolerating p.o.

## 2024-05-15 NOTE — ED ADULT NURSE REASSESSMENT NOTE - NS ED NURSE REASSESS COMMENT FT1
Multiple attempts made by different RNs to place PIV access. no success  provider made aware  at this time, holding off on IV fluids. toradol given IM as per verbal order from MD Forte

## 2024-05-15 NOTE — ED PROVIDER NOTE - CARDIAC, MLM
Met with patient  to review role of care management, progression of care and possible need for services at discharge, including OP services, home care, or skilled nursing care. Patient alert, oriented and engaged in the conversation.     Assessed, lives w/adult dtr, no svcs and independent at baseline, uses home oxygen 2.5L at bedtime and dtr to transport. Discussed MOON. States he was on hospice but now maybe just palliative care.    Previously found in notes that a family member reports that he is staying with her for now but was told by son that will be moving into Assisted Living as [facility] worker is helping him with process.       [facility] worker [phone] for update regarding assisted living.        Normal rate, regular rhythm.  Heart sounds S1, S2.  No murmurs, rubs or gallops.

## 2024-05-15 NOTE — ED PROVIDER NOTE - PATIENT PORTAL LINK FT
You can access the FollowMyHealth Patient Portal offered by [facility] by registering at the following website: [email or web address]. By joining [employer]’s FollowMyHealth portal, you will also be able to view your health information using other applications (apps) compatible with our system.

## 2024-05-15 NOTE — ED PROVIDER NOTE - OBJECTIVE STATEMENT
55 yo M Hx HIV, on gemvoya, smoker - vapes, presents with cough, congestion, runny nose, chest tightness, mild sob, and headache and bodyaches since may 5, with minimal improvement. denies weight loss, night sweats. Using home mucinex with minimal relief. denies syncope, palpitations, sick contacts or recent travel. tried to see pmd, and got an appt for may 23 felt worse tonight. Denies drugs or etoh.

## 2024-09-09 ENCOUNTER — EMERGENCY (EMERGENCY)
Facility: HOSPITAL | Age: 54
LOS: 1 days | Discharge: HOME | End: 2024-09-09
Attending: EMERGENCY MEDICINE | Admitting: EMERGENCY MEDICINE
Payer: MEDICARE

## 2024-09-09 VITALS
HEART RATE: 96 BPM | RESPIRATION RATE: 18 BRPM | SYSTOLIC BLOOD PRESSURE: 128 MMHG | OXYGEN SATURATION: 96 % | TEMPERATURE: 98 F | DIASTOLIC BLOOD PRESSURE: 92 MMHG

## 2024-09-09 VITALS
HEART RATE: 100 BPM | RESPIRATION RATE: 18 BRPM | OXYGEN SATURATION: 98 % | SYSTOLIC BLOOD PRESSURE: 140 MMHG | TEMPERATURE: 98 F | DIASTOLIC BLOOD PRESSURE: 89 MMHG

## 2024-09-09 DIAGNOSIS — R05.9 COUGH, UNSPECIFIED: ICD-10-CM

## 2024-09-09 DIAGNOSIS — Z20.822 CONTACT WITH AND (SUSPECTED) EXPOSURE TO COVID-19: ICD-10-CM

## 2024-09-09 DIAGNOSIS — Z21 ASYMPTOMATIC HUMAN IMMUNODEFICIENCY VIRUS [HIV] INFECTION STATUS: ICD-10-CM

## 2024-09-09 DIAGNOSIS — B34.9 VIRAL INFECTION, UNSPECIFIED: ICD-10-CM

## 2024-09-09 DIAGNOSIS — F17.210 NICOTINE DEPENDENCE, CIGARETTES, UNCOMPLICATED: ICD-10-CM

## 2024-09-09 LAB
FLUAV AG NPH QL: SIGNIFICANT CHANGE UP
FLUBV AG NPH QL: SIGNIFICANT CHANGE UP
RSV RNA NPH QL NAA+NON-PROBE: SIGNIFICANT CHANGE UP
SARS-COV-2 RNA SPEC QL NAA+PROBE: SIGNIFICANT CHANGE UP

## 2024-09-09 PROCEDURE — 71046 X-RAY EXAM CHEST 2 VIEWS: CPT | Mod: 26

## 2024-09-09 PROCEDURE — 99053 MED SERV 10PM-8AM 24 HR FAC: CPT

## 2024-09-09 PROCEDURE — 99284 EMERGENCY DEPT VISIT MOD MDM: CPT

## 2024-09-09 NOTE — ED PROVIDER NOTE - OBJECTIVE STATEMENT
Patient is a 54-year-old male with a history of HIV and presents with 5 days of URI symptoms.  Patient reports a productive cough, body aches, subjective fevers, and chills.  Patient reports phlegm is yellow in color but nonbloody.    Review of systems is otherwise negative.

## 2024-09-09 NOTE — ED PROVIDER NOTE - CLINICAL SUMMARY MEDICAL DECISION MAKING FREE TEXT BOX
Presentation is concerning for upper respiratory infection however given patient's productive cough and HIV status we will perform a chest x-ray to rule out pneumonia versus walking pneumonia.  We will send viral swab.  We will suggest supportive care. Presentation is concerning for upper respiratory infection however given patient's productive cough and HIV status we will perform a chest x-ray to rule out pneumonia versus walking pneumonia.  We will send viral swab.  We will suggest supportive care.    //cxr prelim clear. flu/covid testing negative, advised supportive care, f/u pmd, return precautions discussed.

## 2024-09-09 NOTE — ED PROVIDER NOTE - NSFOLLOWUPINSTRUCTIONS_ED_ALL_ED_FT
Viral Respiratory Infection    A viral respiratory infection is an illness that affects parts of the body used for breathing, like the lungs, nose, and throat. It is caused by a germ called a virus. Symptoms can include runny nose, coughing, sneezing, fatigue, body aches, sore throat, fever, or headache. Over the counter medicine can be used to manage the symptoms but the infection typically goes away on its own in 5 to 10 days.     SEEK IMMEDIATE MEDICAL CARE IF YOU HAVE ANY OF THE FOLLOWING SYMPTOMS: shortness of breath, chest pain, fever over 10 days, or lightheadedness/dizziness.    PLEASE FOLLOW-UP WITH YOUR PRIMARY CARE DOCTOR IN 1-2 DAYS FOR FURTHER EVALUATION.      PLEASE TAKE ALL PAPERWORK FROM TODAY'S VISIT TO YOUR PRIMARY DOCTOR.     IF YOU DO NOT HAVE A PRIMARY CARE DOCTOR PLEASE REFER TO THE OFFICE/CLINIC INFORMATION GIVEN BELOW:    If you do not have a doctor, you can call our referral line to find a doctor that matches your insurance; the number is [phone].     You can also follow up with clinics listed below, if you do not have a doctor:  [place] [address]  To make an appointment, call [phone]    [facility]  Address: [address] [place] [address]  Appointment Center: [phone])     To access your record on the patient portal [facility], please visit:  [email or web address]  If you are having difficulties setting this up, call [phone] and someone can assist you over the phone.     PLEASE RETURN TO THE ER IMMEDIATELY OR CALL [phone] ANY HIGH FEVER, CHEST PAIN, TROUBLE BREATHING, VOMITING, SEVERE PAIN, OR ANY OTHER CONCERNS

## 2024-09-09 NOTE — ED PROVIDER NOTE - NS ED ATTENDING STATEMENT MOD
Attending Only This was a shared visit with the [place]. I reviewed and verified the documentation.

## 2024-09-09 NOTE — ED PROVIDER NOTE - ATTENDING APP SHARED VISIT CONTRIBUTION OF CARE
Dr. Bejarano: This patient was initially seen by me and my note is as above.  The HPI/Exam/MDM were composed by me.   The [place] helped in discharging this patient.

## 2024-09-09 NOTE — ED PROVIDER NOTE - PATIENT PORTAL LINK FT
You can access the FollowMyHealth Patient Portal offered by [facility] by registering at the following website: [email or web address]. By joining [facility]’s FollowMyHealth portal, you will also be able to view your health information using other applications (apps) compatible with our system.

## 2024-12-02 NOTE — ED PROCEDURE NOTE - NS_EDPROVIDERDISPOUSERTYPE_ED_A_ED
Get your Prescription filled at [facility]!    [facility] uses the same medical record your doctor uses. More accurate and timely information for your doctor and your pharmacy means more effective and safer health care for you and your family.  [facility] accepts all of the major insurance plans which means you pay the same copay wherever you go.  [facility] Pharmacists take the time to explain your medication regimen to you.  [facility] will mail your medications to you free of postage and handling charges. We love francisca.        At [facility], one important tool we use to improve our patient services is our Patient Survey.  Following your visit you may receive our survey in the mail.    Please take the time to complete the survey.    If your visit with us was great, we want to hear about it.    If we can improve, please let us know how.           
Attending Attestation (For Attendings USE Only)...

## 2025-02-04 NOTE — ED ADULT NURSE NOTE - HIV OFFER
I spoke with Maged he advised that he does not want to schedule a sooner appointment. He will keep the one he has scheduled already  
Onset: chronic     Location / description: patient reports his meniscus are both bad. Provider gave him a prescription that helps with his pain. Was supposed to go back to see the ortho provider regarding possible surgery. Patient was wanting a refill of his pain medication. Both knees are swollen and are in a lot of pain in his knees.   Still going to work like normal but having increased pain since he is running out of his medication     Precipitating Factors: chronic pain in knees. Running out of pain medication     Pain Scale (1-10), 10 highest: 8/10 right knee worse than left over the last couple of days.     What  improves / worsens symptoms: only diclofenac helps but is running out. Does not have an appointment until 2/28    Symptom specific medications: ibuprofen, diclofenac     Recent visits (last 3-4 weeks) for same reason or recent surgery:  no evaluation in the last month    Reports unable to make a sooner appointment than 2/28 he will have to pay $200 out of pocket due to insurance change. Advised urgent care until appointment if unable to make one with pcp. Patient agreeable.     PLAN:  Provider's office  See Provider within 24 hour    Patient/Caller agrees to follow recommendations.     Reason for Disposition   SEVERE pain (e.g., excruciating, unable to walk)    Protocols used: Knee Pain-A-OH    
Patient already triaged by Loni Koch RN for chronic knee pain.    Patient reports that he is unable to make a sooner appointment than 2/28 he will have to pay $200 out of pocket due to insurance change. Advised urgent care until appointment if unable to make one with pcp. Patient agreeable.      Routed to PSR to see if sooner appointment than 2/28/25 could be scheduled.   
Regarding: [place] 60 y/o M; bilateral knee pain 8/10  ----- Message from Marika ANDERSEN sent at 2/4/2025  8:26 AM CST -----  Patient Name: Maged Cao    Specialist or PCP Name: Yoselin Pham    Symptoms: bilateral knee pain, rates pain 8/10, has not been able to take medication like he should because doctor denied refill    Pregnant (females aged 13-60. If Yes, how long?) : n/a    Call Back # : [phone]    Which State are you currently located in?: [place]    [facility]ct[facility] Work Hours    
Previously Declined (within the last year)

## 2025-06-14 ENCOUNTER — EMERGENCY (EMERGENCY)
Facility: HOSPITAL | Age: 55
LOS: 1 days | End: 2025-06-14
Attending: EMERGENCY MEDICINE | Admitting: EMERGENCY MEDICINE
Payer: MEDICARE

## 2025-06-14 VITALS
SYSTOLIC BLOOD PRESSURE: 138 MMHG | RESPIRATION RATE: 18 BRPM | DIASTOLIC BLOOD PRESSURE: 87 MMHG | HEIGHT: 68 IN | WEIGHT: 154.1 LBS | OXYGEN SATURATION: 97 % | TEMPERATURE: 98 F | HEART RATE: 110 BPM

## 2025-06-14 VITALS
DIASTOLIC BLOOD PRESSURE: 75 MMHG | HEART RATE: 99 BPM | TEMPERATURE: 98 F | SYSTOLIC BLOOD PRESSURE: 111 MMHG | OXYGEN SATURATION: 97 % | RESPIRATION RATE: 18 BRPM

## 2025-06-14 LAB
ALBUMIN SERPL ELPH-MCNC: 3.3 G/DL — LOW (ref 3.4–5)
ALP SERPL-CCNC: 122 U/L — HIGH (ref 40–120)
ALT FLD-CCNC: 40 U/L — SIGNIFICANT CHANGE UP (ref 12–42)
ANION GAP SERPL CALC-SCNC: 3 MMOL/L — LOW (ref 9–16)
AST SERPL-CCNC: 18 U/L — SIGNIFICANT CHANGE UP (ref 15–37)
BASOPHILS # BLD AUTO: 0.03 K/UL — SIGNIFICANT CHANGE UP (ref 0–0.2)
BASOPHILS NFR BLD AUTO: 0.6 % — SIGNIFICANT CHANGE UP (ref 0–2)
BILIRUB SERPL-MCNC: 0.6 MG/DL — SIGNIFICANT CHANGE UP (ref 0.2–1.2)
BUN SERPL-MCNC: 19 MG/DL — SIGNIFICANT CHANGE UP (ref 7–23)
CALCIUM SERPL-MCNC: 8.9 MG/DL — SIGNIFICANT CHANGE UP (ref 8.5–10.5)
CHLORIDE SERPL-SCNC: 103 MMOL/L — SIGNIFICANT CHANGE UP (ref 96–108)
CO2 SERPL-SCNC: 33 MMOL/L — HIGH (ref 22–31)
CREAT SERPL-MCNC: 0.83 MG/DL — SIGNIFICANT CHANGE UP (ref 0.5–1.3)
EGFR: 105 ML/MIN/1.73M2 — SIGNIFICANT CHANGE UP
EGFR: 105 ML/MIN/1.73M2 — SIGNIFICANT CHANGE UP
EOSINOPHIL # BLD AUTO: 0.16 K/UL — SIGNIFICANT CHANGE UP (ref 0–0.5)
EOSINOPHIL NFR BLD AUTO: 3.3 % — SIGNIFICANT CHANGE UP (ref 0–6)
FLUAV AG NPH QL: SIGNIFICANT CHANGE UP
FLUBV AG NPH QL: SIGNIFICANT CHANGE UP
GLUCOSE SERPL-MCNC: 114 MG/DL — HIGH (ref 70–99)
HCT VFR BLD CALC: 41.3 % — SIGNIFICANT CHANGE UP (ref 39–50)
HGB BLD-MCNC: 13.6 G/DL — SIGNIFICANT CHANGE UP (ref 13–17)
IMM GRANULOCYTES # BLD AUTO: 0.01 K/UL — SIGNIFICANT CHANGE UP (ref 0–0.07)
IMM GRANULOCYTES NFR BLD AUTO: 0.2 % — SIGNIFICANT CHANGE UP (ref 0–0.9)
LYMPHOCYTES # BLD AUTO: 1.83 K/UL — SIGNIFICANT CHANGE UP (ref 1–3.3)
LYMPHOCYTES NFR BLD AUTO: 37.7 % — SIGNIFICANT CHANGE UP (ref 13–44)
MCHC RBC-ENTMCNC: 31.6 PG — SIGNIFICANT CHANGE UP (ref 27–34)
MCHC RBC-ENTMCNC: 32.9 G/DL — SIGNIFICANT CHANGE UP (ref 32–36)
MCV RBC AUTO: 96 FL — SIGNIFICANT CHANGE UP (ref 80–100)
MONOCYTES # BLD AUTO: 0.62 K/UL — SIGNIFICANT CHANGE UP (ref 0–0.9)
MONOCYTES NFR BLD AUTO: 12.8 % — SIGNIFICANT CHANGE UP (ref 2–14)
NEUTROPHILS # BLD AUTO: 2.2 K/UL — SIGNIFICANT CHANGE UP (ref 1.8–7.4)
NEUTROPHILS NFR BLD AUTO: 45.4 % — SIGNIFICANT CHANGE UP (ref 43–77)
NRBC # BLD AUTO: 0 K/UL — SIGNIFICANT CHANGE UP (ref 0–0)
NRBC # FLD: 0 K/UL — SIGNIFICANT CHANGE UP (ref 0–0)
NRBC BLD AUTO-RTO: 0 /100 WBCS — SIGNIFICANT CHANGE UP (ref 0–0)
PLATELET # BLD AUTO: 227 K/UL — SIGNIFICANT CHANGE UP (ref 150–400)
PMV BLD: 8.8 FL — SIGNIFICANT CHANGE UP (ref 7–13)
POTASSIUM SERPL-MCNC: 3.9 MMOL/L — SIGNIFICANT CHANGE UP (ref 3.5–5.3)
POTASSIUM SERPL-SCNC: 3.9 MMOL/L — SIGNIFICANT CHANGE UP (ref 3.5–5.3)
PROT SERPL-MCNC: 6.9 G/DL — SIGNIFICANT CHANGE UP (ref 6.4–8.2)
RBC # BLD: 4.3 M/UL — SIGNIFICANT CHANGE UP (ref 4.2–5.8)
RBC # FLD: 12.8 % — SIGNIFICANT CHANGE UP (ref 10.3–14.5)
RSV RNA NPH QL NAA+NON-PROBE: SIGNIFICANT CHANGE UP
SARS-COV-2 RNA SPEC QL NAA+PROBE: SIGNIFICANT CHANGE UP
SODIUM SERPL-SCNC: 139 MMOL/L — SIGNIFICANT CHANGE UP (ref 132–145)
SOURCE RESPIRATORY: SIGNIFICANT CHANGE UP
TROPONIN I, HIGH SENSITIVITY RESULT: 5.3 NG/L — SIGNIFICANT CHANGE UP
WBC # BLD: 4.85 K/UL — SIGNIFICANT CHANGE UP (ref 3.8–10.5)
WBC # FLD AUTO: 4.85 K/UL — SIGNIFICANT CHANGE UP (ref 3.8–10.5)

## 2025-06-14 PROCEDURE — 99284 EMERGENCY DEPT VISIT MOD MDM: CPT

## 2025-06-14 PROCEDURE — 71046 X-RAY EXAM CHEST 2 VIEWS: CPT | Mod: 26

## 2025-06-14 RX ORDER — KETOROLAC TROMETHAMINE 30 MG/ML
30 INJECTION, SOLUTION INTRAMUSCULAR; INTRAVENOUS ONCE
Refills: 0 | Status: DISCONTINUED | OUTPATIENT
Start: 2025-06-14 | End: 2025-06-14

## 2025-06-14 RX ORDER — ACETAMINOPHEN 500 MG/5ML
650 LIQUID (ML) ORAL ONCE
Refills: 0 | Status: COMPLETED | OUTPATIENT
Start: 2025-06-14 | End: 2025-06-14

## 2025-06-14 RX ORDER — ONDANSETRON HCL/PF 4 MG/2 ML
4 VIAL (ML) INJECTION ONCE
Refills: 0 | Status: COMPLETED | OUTPATIENT
Start: 2025-06-14 | End: 2025-06-14

## 2025-06-14 RX ORDER — DEXAMETHASONE 0.5 MG/1
4 TABLET ORAL ONCE
Refills: 0 | Status: COMPLETED | OUTPATIENT
Start: 2025-06-14 | End: 2025-06-14

## 2025-06-14 RX ORDER — AMOXICILLIN 500 MG/1
1 CAPSULE ORAL
Qty: 14 | Refills: 0
Start: 2025-06-14 | End: 2025-06-20

## 2025-06-14 RX ADMIN — Medication 2000 MILLILITER(S): at 17:07

## 2025-06-14 RX ADMIN — Medication 650 MILLIGRAM(S): at 15:50

## 2025-06-14 RX ADMIN — KETOROLAC TROMETHAMINE 30 MILLIGRAM(S): 30 INJECTION, SOLUTION INTRAMUSCULAR; INTRAVENOUS at 17:05

## 2025-06-14 RX ADMIN — Medication 4 MILLIGRAM(S): at 17:05

## 2025-06-14 RX ADMIN — DEXAMETHASONE 4 MILLIGRAM(S): 0.5 TABLET ORAL at 17:06

## 2025-06-14 NOTE — ED ADULT NURSE NOTE - OBJECTIVE STATEMENT
pt presents to ed c/o fatigue and flu-like symptoms x 1 day, pt reports chills, generalized rash and increased sleepiness. denies fever, nausea, vomiting, cp, sob.

## 2025-06-14 NOTE — ED PROVIDER NOTE - CARE PLAN
Principal Discharge DX:	Cough  Secondary Diagnosis:	Viral syndrome  Secondary Diagnosis:	Sore throat   1

## 2025-06-14 NOTE — ED PROVIDER NOTE - NSFOLLOWUPINSTRUCTIONS_ED_ALL_ED_FT
Take amoxicillin as prescribed  Follow up with primary care for a further evaluation of symptoms  return to the ED if you develop worsening symptoms

## 2025-06-14 NOTE — ED PROVIDER NOTE - ATTENDING APP SHARED VISIT CONTRIBUTION OF CARE
I have personally seen and examined this patient. I have fully participated in the care of this patient. I have reviewed all pertinent clinical information, including history, physical exam and plan.    appears well at time of dc home

## 2025-06-14 NOTE — ED PROVIDER NOTE - PHYSICAL EXAMINATION
On PE, patient is well appearing, non toxic appearing and in no acute distress. throat with bilateral swelling, uvual midline no swelling, no obvious exudates. Lungs sound clear. normal S1/S2. no LE edema or calf tenderness. no focal neurological deficits.

## 2025-06-14 NOTE — ED ADULT TRIAGE NOTE - CHIEF COMPLAINT QUOTE
walk in c/o fatigue, weakness, chills and generalized rash x3 days. expresses rash seemed to get worse last night. + cough. minor rash visualized to left arm, pt also expresses chest pain

## 2025-06-14 NOTE — ED PROVIDER NOTE - CLINICAL SUMMARY MEDICAL DECISION MAKING FREE TEXT BOX
Patient is a 54 y/o male, who has a PMHx of HIV, presents to the ED as a walk in for an evaluation of chills, sore throat, cough, nausea, fatigue and feeling "run down" x 3 days. Hx of pneumonia. denies chest pain and SOB. On PE, patient well appearing, lungs sounds clear. throat with bilateral swelling, no exudates.   suspect viral infection vs pneumonia, immunocompromised. will obtain labs, ECG, CXR, will give fluids, toradol, acetaminophen, decadron.

## 2025-06-14 NOTE — ED PROVIDER NOTE - OBJECTIVE STATEMENT
Patient is a 54 y/o male, who has a PMHx of HIV, presents to the ED as a walk in for an evaluation of chills, sore throat, cough, nausea, fatigue and feeling "run down" x 3 days. Patient reports he feels like he has a "chest cold" Patient reports hx of pneumonia last year, feels similar. Patient denies recent travel, fever, vomiting. abdominal pain. Patient denies difficulty swallowing, drooling, changes invoice. Patient denies chest pain, contrary to triage note.

## 2025-06-14 NOTE — ED PROVIDER NOTE - ENMT, MLM
Airway patent, Nasal mucosa clear. Mouth with normal mucosa. bilateral swelling. Throat has no vesicles, no oropharyngeal exudates and uvula is midline.

## 2025-06-14 NOTE — ED PROVIDER NOTE - PROGRESS NOTE DETAILS
On re-eval, patient reports feeling better with fluids, but sore throat persist. PE revealed bilateral swelling, given decadron, will dispo with amoxicillin. throat culture sent. patient agreeable with plan for discharge at this time. given strict return precautions.

## 2025-06-14 NOTE — ED PROVIDER NOTE - PATIENT PORTAL LINK FT
You can access the FollowMyHealth Patient Portal offered by [facility] by registering at the following website: [email or web address]. By joining [facility]’s FollowMyHealth portal, you will also be able to view your health information using other applications (apps) compatible with our system.

## 2025-06-14 NOTE — ED ADULT NURSE NOTE - IN THE PAST 12 MONTHS HAVE YOU USED DRUGS OTHER THAN THOSE REQUIRED FOR MEDICAL REASON?
Location #1: Elbows Detail Level: Detailed Fluence #2 (J/Cm2 Or Mj/Cm2): 1200 MJ Mode: repeat paint Total Square Area In Cm2 (Required For Proper Billing- Whole Numbers Only Please): C/ Eloisa 66 Treatment Number: 25 Location #3: Buttock Area No Post-Care Instructions: I reviewed with the patient in detail post-care instructions. Patient should stay away from the sun and wear sun protection until treated areas are fully healed. Location #2: Scalp & Ears Device Serial Number (Optional): [id number] Fluence Units: J/cm2 Consent: Written consent obtained, risks reviewed including but not limited to crusting, scabbing, blistering, scarring, darker or lighter pigmentary change, incidental hair removal, bruising, and/or incomplete removal. Spot Size: 2 x 2 cm

## 2025-06-18 DIAGNOSIS — J02.9 ACUTE PHARYNGITIS, UNSPECIFIED: ICD-10-CM

## 2025-06-18 DIAGNOSIS — F17.200 NICOTINE DEPENDENCE, UNSPECIFIED, UNCOMPLICATED: ICD-10-CM

## 2025-06-18 DIAGNOSIS — Z21 ASYMPTOMATIC HUMAN IMMUNODEFICIENCY VIRUS [HIV] INFECTION STATUS: ICD-10-CM

## 2025-06-18 DIAGNOSIS — Z87.01 PERSONAL HISTORY OF PNEUMONIA (RECURRENT): ICD-10-CM

## 2025-06-18 DIAGNOSIS — B34.9 VIRAL INFECTION, UNSPECIFIED: ICD-10-CM

## 2025-07-31 ENCOUNTER — EMERGENCY (EMERGENCY)
Facility: HOSPITAL | Age: 55
LOS: 1 days | End: 2025-07-31
Attending: EMERGENCY MEDICINE | Admitting: EMERGENCY MEDICINE
Payer: MEDICARE

## 2025-07-31 VITALS
TEMPERATURE: 98 F | HEART RATE: 96 BPM | RESPIRATION RATE: 16 BRPM | DIASTOLIC BLOOD PRESSURE: 89 MMHG | SYSTOLIC BLOOD PRESSURE: 124 MMHG | OXYGEN SATURATION: 98 %

## 2025-07-31 VITALS
TEMPERATURE: 98 F | WEIGHT: 151.9 LBS | OXYGEN SATURATION: 98 % | SYSTOLIC BLOOD PRESSURE: 148 MMHG | HEART RATE: 107 BPM | RESPIRATION RATE: 18 BRPM | HEIGHT: 68 IN | DIASTOLIC BLOOD PRESSURE: 89 MMHG

## 2025-07-31 LAB
ALBUMIN SERPL ELPH-MCNC: 4 G/DL — SIGNIFICANT CHANGE UP (ref 3.4–5)
ALP SERPL-CCNC: 130 U/L — HIGH (ref 40–120)
ALT FLD-CCNC: 45 U/L — HIGH (ref 12–42)
ANION GAP SERPL CALC-SCNC: 6 MMOL/L — LOW (ref 9–16)
AST SERPL-CCNC: 25 U/L — SIGNIFICANT CHANGE UP (ref 15–37)
BILIRUB SERPL-MCNC: 0.6 MG/DL — SIGNIFICANT CHANGE UP (ref 0.2–1.2)
BUN SERPL-MCNC: 22 MG/DL — SIGNIFICANT CHANGE UP (ref 7–23)
CALCIUM SERPL-MCNC: 9.3 MG/DL — SIGNIFICANT CHANGE UP (ref 8.5–10.5)
CHLORIDE SERPL-SCNC: 104 MMOL/L — SIGNIFICANT CHANGE UP (ref 96–108)
CK MB BLD-MCNC: 3.33 % — SIGNIFICANT CHANGE UP
CK MB CFR SERPL CALC: 5.6 NG/ML — HIGH (ref 0.5–3.6)
CK SERPL-CCNC: 168 U/L — SIGNIFICANT CHANGE UP (ref 39–308)
CO2 SERPL-SCNC: 30 MMOL/L — SIGNIFICANT CHANGE UP (ref 22–31)
CREAT SERPL-MCNC: 0.75 MG/DL — SIGNIFICANT CHANGE UP (ref 0.5–1.3)
EGFR: 108 ML/MIN/1.73M2 — SIGNIFICANT CHANGE UP
EGFR: 108 ML/MIN/1.73M2 — SIGNIFICANT CHANGE UP
GLUCOSE SERPL-MCNC: 107 MG/DL — HIGH (ref 70–99)
HCT VFR BLD CALC: 42.6 % — SIGNIFICANT CHANGE UP (ref 39–50)
HGB BLD-MCNC: 14.5 G/DL — SIGNIFICANT CHANGE UP (ref 13–17)
MCHC RBC-ENTMCNC: 32.1 PG — SIGNIFICANT CHANGE UP (ref 27–34)
MCHC RBC-ENTMCNC: 34 G/DL — SIGNIFICANT CHANGE UP (ref 32–36)
MCV RBC AUTO: 94.2 FL — SIGNIFICANT CHANGE UP (ref 80–100)
NRBC # BLD AUTO: 0 K/UL — SIGNIFICANT CHANGE UP (ref 0–0)
NRBC # FLD: 0 K/UL — SIGNIFICANT CHANGE UP (ref 0–0)
NRBC BLD AUTO-RTO: 0 /100 WBCS — SIGNIFICANT CHANGE UP (ref 0–0)
PLATELET # BLD AUTO: 197 K/UL — SIGNIFICANT CHANGE UP (ref 150–400)
PMV BLD: 8.9 FL — SIGNIFICANT CHANGE UP (ref 7–13)
POTASSIUM SERPL-MCNC: 4.2 MMOL/L — SIGNIFICANT CHANGE UP (ref 3.5–5.3)
POTASSIUM SERPL-SCNC: 4.2 MMOL/L — SIGNIFICANT CHANGE UP (ref 3.5–5.3)
PROT SERPL-MCNC: 7.8 G/DL — SIGNIFICANT CHANGE UP (ref 6.4–8.2)
RBC # BLD: 4.52 M/UL — SIGNIFICANT CHANGE UP (ref 4.2–5.8)
RBC # FLD: 13 % — SIGNIFICANT CHANGE UP (ref 10.3–14.5)
SODIUM SERPL-SCNC: 140 MMOL/L — SIGNIFICANT CHANGE UP (ref 132–145)
TROPONIN I, HIGH SENSITIVITY RESULT: 6.1 NG/L — SIGNIFICANT CHANGE UP
WBC # BLD: 5.58 K/UL — SIGNIFICANT CHANGE UP (ref 3.8–10.5)
WBC # FLD AUTO: 5.58 K/UL — SIGNIFICANT CHANGE UP (ref 3.8–10.5)

## 2025-07-31 PROCEDURE — 71275 CT ANGIOGRAPHY CHEST: CPT | Mod: 26

## 2025-07-31 PROCEDURE — 99285 EMERGENCY DEPT VISIT HI MDM: CPT

## 2025-07-31 RX ORDER — ALBUTEROL SULFATE 2.5 MG/3ML
2 VIAL, NEBULIZER (ML) INHALATION
Qty: 1 | Refills: 0
Start: 2025-07-31 | End: 2025-08-09

## 2025-07-31 RX ORDER — AZITHROMYCIN 250 MG
1 CAPSULE ORAL
Qty: 3 | Refills: 0
Start: 2025-07-31 | End: 2025-08-02

## 2025-07-31 RX ORDER — DEXTROMETHORPHAN HBR, GUAIFENESIN 200 MG/10ML
1 LIQUID ORAL
Qty: 14 | Refills: 0
Start: 2025-07-31 | End: 2025-08-06

## 2025-07-31 RX ADMIN — Medication 1000 MILLILITER(S): at 19:17

## 2025-08-04 DIAGNOSIS — Z86.711 PERSONAL HISTORY OF PULMONARY EMBOLISM: ICD-10-CM

## 2025-08-04 DIAGNOSIS — Z87.891 PERSONAL HISTORY OF NICOTINE DEPENDENCE: ICD-10-CM

## 2025-08-04 DIAGNOSIS — Z21 ASYMPTOMATIC HUMAN IMMUNODEFICIENCY VIRUS [HIV] INFECTION STATUS: ICD-10-CM

## 2025-08-04 DIAGNOSIS — J20.9 ACUTE BRONCHITIS, UNSPECIFIED: ICD-10-CM

## 2025-08-04 DIAGNOSIS — R06.02 SHORTNESS OF BREATH: ICD-10-CM
